# Patient Record
Sex: MALE | Race: WHITE | NOT HISPANIC OR LATINO | Employment: OTHER | URBAN - NONMETROPOLITAN AREA
[De-identification: names, ages, dates, MRNs, and addresses within clinical notes are randomized per-mention and may not be internally consistent; named-entity substitution may affect disease eponyms.]

---

## 2024-04-03 ENCOUNTER — OFFICE VISIT (OUTPATIENT)
Dept: ORTHOPEDIC SURGERY | Facility: CLINIC | Age: 69
End: 2024-04-03
Payer: MEDICARE

## 2024-04-03 VITALS — WEIGHT: 235 LBS | BODY MASS INDEX: 35.61 KG/M2 | HEIGHT: 68 IN

## 2024-04-03 DIAGNOSIS — M17.11 PRIMARY OSTEOARTHRITIS OF RIGHT KNEE: Primary | ICD-10-CM

## 2024-04-03 DIAGNOSIS — M17.12 PRIMARY OSTEOARTHRITIS OF LEFT KNEE: ICD-10-CM

## 2024-04-03 RX ADMIN — METHYLPREDNISOLONE ACETATE 40 MG: 40 INJECTION, SUSPENSION INTRA-ARTICULAR; INTRALESIONAL; INTRAMUSCULAR; SOFT TISSUE at 19:14

## 2024-04-03 RX ADMIN — LIDOCAINE HYDROCHLORIDE 5 ML: 10 INJECTION, SOLUTION EPIDURAL; INFILTRATION; INTRACAUDAL; PERINEURAL at 19:14

## 2024-04-03 NOTE — PROGRESS NOTES
Follow-up Visit         Patient: Abraham Blanco  YOB: 1955  Date of Encounter: 2024      Chief  Complaint:   Chief Complaint   Patient presents with    Left Knee - Follow-up, Pain    Right Knee - Follow-up, Pain         HPI:  Abraham Blanco, 68 y.o. male presents in follow-up bilateral knee pain known osteoarthritis was last treated with intra-articular steroid injections May 3, 2023 he presents today reporting good response initially but pain has slowly returned now to the point his pain has increased significantly.  Reports no additional injuries.        Medical History:  Patient Active Problem List   Diagnosis    Prostate cancer    Acute pancreatitis without infection or necrosis    Rotator cuff tear, non-traumatic, left     Past Medical History:   Diagnosis Date    Arthritis of back     Arthritis of neck     CAD (coronary artery disease)     s/p 4-vessel CABG and stenting    CHF (congestive heart failure)     Diabetes mellitus     Diverticula of colon     Elevated PSA     Frozen shoulder     GERD (gastroesophageal reflux disease)     Heart disease     Hip arthrosis     Hyperlipidemia     Hypertension     Knee swelling     Neck strain     CHANEL (obstructive sleep apnea)     on AutoPAP           Social History:  Social History     Socioeconomic History    Marital status:    Tobacco Use    Smoking status: Former     Current packs/day: 0.00     Average packs/day: 2.0 packs/day for 25.2 years (50.4 ttl pk-yrs)     Types: Cigarettes     Start date: 10/11/1971     Quit date: 1997     Years since quittin.2    Smokeless tobacco: Never   Vaping Use    Vaping status: Never Used   Substance and Sexual Activity    Alcohol use: No    Drug use: No    Sexual activity: Not Currently     Partners: Female           Current Medications:    Current Outpatient Medications:     aspirin 81 MG EC tablet, Take 1 tablet by mouth Daily., Disp: , Rfl:     atorvastatin (LIPITOR) 20 MG tablet, Take 1  tablet by mouth Daily., Disp: , Rfl:     baclofen (LIORESAL) 10 MG tablet, Take 1 tablet by mouth 3 (Three) Times a Day As Needed for Muscle Spasms., Disp: , Rfl:     docusate sodium (Colace) 100 MG capsule, Take 1 capsule by mouth 2 (Two) Times a Day., Disp: 20 capsule, Rfl: 0    finasteride (PROSCAR) 5 MG tablet, Take 1 tablet by mouth Daily., Disp: , Rfl:     gabapentin (NEURONTIN) 300 MG capsule, Take 1 capsule by mouth 3 (Three) Times a Day As Needed., Disp: , Rfl:     insulin glargine (LANTUS, SEMGLEE) 100 UNIT/ML injection, Inject 80 Units under the skin into the appropriate area as directed Every Night., Disp: , Rfl:     isosorbide mononitrate (IMDUR) 60 MG 24 hr tablet, Take 1 tablet by mouth Daily., Disp: , Rfl:     lisinopril (PRINIVIL,ZESTRIL) 10 MG tablet, Take 1 tablet by mouth Daily., Disp: , Rfl:     metoprolol succinate XL (TOPROL-XL) 50 MG 24 hr tablet, Take 1 tablet by mouth Daily., Disp: , Rfl:     NovoLIN R FlexPen 100 UNIT/ML injection, , Disp: , Rfl:     oxyCODONE-acetaminophen (PERCOCET) 5-325 MG per tablet, Take 1 tablet by mouth Every 4 (Four) Hours As Needed for Moderate Pain., Disp: 30 tablet, Rfl: 0    pantoprazole (PROTONIX) 40 MG EC tablet, Take 1 tablet by mouth Daily., Disp: , Rfl:     sertraline (ZOLOFT) 100 MG tablet, Take 1 tablet by mouth Daily., Disp: , Rfl:     tamsulosin (FLOMAX) 0.4 MG capsule 24 hr capsule, Take 1 capsule by mouth Daily., Disp: , Rfl:         Allergies:  Allergies   Allergen Reactions    Phenergan [Promethazine] Hallucinations           Family History:  Family History   Problem Relation Age of Onset    Diabetes Mother     Hypertension Mother     Cancer Mother     Hypertension Father            Surgical History:  Past Surgical History:   Procedure Laterality Date    CARDIAC SURGERY  2011    4 vessel bypass    NECK SURGERY      SHOULDER ARTHROSCOPY W/ ROTATOR CUFF REPAIR Left 11/07/2023    Procedure: LEFT SHOULDER ARTHROSCOPY WITH MINI OPEN ROTATOR CUFF  REPAIR, ACROMIOPLASTY, EXCISION LATERAL CLAVICLE;  Surgeon: Steve Serna MD;  Location: [facility];  Service: Orthopedics;  Laterality: Left;    SHOULDER SURGERY      TONSILLECTOMY             Radiology:   X-ray chest PA and lateral    Result Date: 3/28/2024  1. Mild chronic changes in both lungs, without acute infiltrate. Images reviewed, interpreted, and dictated by Jefferson Fuentes MD           Orthopedic Examination: Bilateral knees reveals minimal effusion with moderate medial joint line tenderness no gross instability range of motion is full neurovascular exam grossly intact.          Assessment & Plan:   68 y.o. male presents osteoarthritis bilateral knees today's provided intra-articular steroid injection Depo-Medrol 40 mg with lidocaine block bilateral knees he will return as needed.           Diagnosis Plan   1. Primary osteoarthritis of right knee        2. Primary osteoarthritis of left knee              Large Joint Arthrocentesis: L knee  Date/Time: 4/3/2024 7:14 PM  Consent given by: patient  Site marked: site marked  Timeout: Immediately prior to procedure a time out was called to verify the correct patient, procedure, equipment, support staff and site/side marked as required   Supporting Documentation  Indications: pain   Procedure Details  Location: knee - L knee  Preparation: Patient was prepped and draped in the usual sterile fashion  Needle size: 25 G  Approach: anterolateral  Medications administered: 5 mL lidocaine PF 1% 1 %; 40 mg methylPREDNISolone acetate 40 MG/ML  Patient tolerance: patient tolerated the procedure well with no immediate complications      Large Joint Arthrocentesis: R knee  Date/Time: 4/3/2024 7:14 PM  Consent given by: patient  Site marked: site marked  Timeout: Immediately prior to procedure a time out was called to verify the correct patient, procedure, equipment, support staff and site/side marked as required   Supporting Documentation  Indications: pain    Procedure Details  Location: knee - R knee  Preparation: Patient was prepped and draped in the usual sterile fashion  Needle size: 25 G  Approach: anterolateral  Medications administered: 40 mg methylPREDNISolone acetate 40 MG/ML; 5 mL lidocaine PF 1% 1 %  Patient tolerance: patient tolerated the procedure well with no immediate complications          Cc:  Estrada Teixeira PA              This document has been electronically signed by Steve Serna MD   April 4, 2024 19:13 EDT

## 2024-04-05 RX ORDER — METHYLPREDNISOLONE ACETATE 40 MG/ML
40 INJECTION, SUSPENSION INTRA-ARTICULAR; INTRALESIONAL; INTRAMUSCULAR; SOFT TISSUE
Status: COMPLETED | OUTPATIENT
Start: 2024-04-03 | End: 2024-04-03

## 2024-04-05 RX ORDER — LIDOCAINE HYDROCHLORIDE 10 MG/ML
5 INJECTION, SOLUTION EPIDURAL; INFILTRATION; INTRACAUDAL; PERINEURAL
Status: COMPLETED | OUTPATIENT
Start: 2024-04-03 | End: 2024-04-03

## 2024-04-17 ENCOUNTER — OFFICE VISIT (OUTPATIENT)
Dept: ORTHOPEDIC SURGERY | Facility: CLINIC | Age: 69
End: 2024-04-17
Payer: MEDICARE

## 2024-04-17 VITALS — WEIGHT: 235 LBS | BODY MASS INDEX: 35.61 KG/M2 | HEIGHT: 68 IN

## 2024-04-17 DIAGNOSIS — M75.122 NONTRAUMATIC COMPLETE TEAR OF LEFT ROTATOR CUFF: Primary | ICD-10-CM

## 2024-04-17 PROCEDURE — 99213 OFFICE O/P EST LOW 20 MIN: CPT | Performed by: ORTHOPAEDIC SURGERY

## 2024-04-17 RX ORDER — EMPAGLIFLOZIN 25 MG/1
TABLET, FILM COATED ORAL
COMMUNITY
Start: 2024-04-11

## 2024-04-17 NOTE — PROGRESS NOTES
Follow-up Visit         Patient: Abraham Blanco  YOB: 1955  Date of Encounter: 04/17/2024      Chief  Complaint:   Chief Complaint   Patient presents with    Left Shoulder - Follow-up, Pain         HPI:  Abraham Blanco, 68 y.o. male presents in follow-up left shoulder pain.  He underwent left shoulder arthroscopy with open rotator cuff repair acromioplasty excision of the lateral clavicle on November 7, 2023.  He was seen 2 months later, January 8, 2024 and reports that he was doing well, pain was well-controlled and describes 90% reduction in his discomfort after completing physical therapy.      Sometime after this his shoulder pain returned, he does not recall an injury.  He attempted to return for follow-up but [facility] no longer participated with his insurance so he traveled to [place] and was evaluated by orthopedic surgeon who suggested reverse shoulder arthroplasty.  He did not proceed and has presented now that we have resumed participation with his insurance provider.  Reports that overall his left shoulder pain is worse than it was prior to his procedure.  He did obtain MRI left shoulder February 14, 2024 by report describing tear of the supraspinatus tendon with retraction.        Medical History:  Patient Active Problem List   Diagnosis    Prostate cancer    Acute pancreatitis without infection or necrosis    Rotator cuff tear, non-traumatic, left     Past Medical History:   Diagnosis Date    Arthritis of back     Arthritis of neck     CAD (coronary artery disease)     s/p 4-vessel CABG and stenting    CHF (congestive heart failure)     Diabetes mellitus     Diverticula of colon     Elevated PSA     Frozen shoulder     GERD (gastroesophageal reflux disease)     Heart disease     Hip arthrosis     Hyperlipidemia     Hypertension     Knee swelling     Neck strain     CHANEL (obstructive sleep apnea)     on AutoPAP           Social History:  Social History     Socioeconomic History     Marital status:    Tobacco Use    Smoking status: Former     Current packs/day: 0.00     Average packs/day: 2.0 packs/day for 25.2 years (50.4 ttl pk-yrs)     Types: Cigarettes     Start date: 10/11/1971     Quit date: 1997     Years since quittin.3    Smokeless tobacco: Never   Vaping Use    Vaping status: Never Used   Substance and Sexual Activity    Alcohol use: No    Drug use: No    Sexual activity: Not Currently     Partners: Female           Current Medications:    Current Outpatient Medications:     aspirin 81 MG EC tablet, Take 1 tablet by mouth Daily., Disp: , Rfl:     atorvastatin (LIPITOR) 20 MG tablet, Take 1 tablet by mouth Daily., Disp: , Rfl:     baclofen (LIORESAL) 10 MG tablet, Take 1 tablet by mouth 3 (Three) Times a Day As Needed for Muscle Spasms., Disp: , Rfl:     docusate sodium (Colace) 100 MG capsule, Take 1 capsule by mouth 2 (Two) Times a Day., Disp: 20 capsule, Rfl: 0    finasteride (PROSCAR) 5 MG tablet, Take 1 tablet by mouth Daily., Disp: , Rfl:     gabapentin (NEURONTIN) 300 MG capsule, Take 1 capsule by mouth 3 (Three) Times a Day As Needed., Disp: , Rfl:     insulin glargine (LANTUS, SEMGLEE) 100 UNIT/ML injection, Inject 80 Units under the skin into the appropriate area as directed Every Night., Disp: , Rfl:     isosorbide mononitrate (IMDUR) 60 MG 24 hr tablet, Take 1 tablet by mouth Daily., Disp: , Rfl:     Jardiance 25 MG tablet tablet, , Disp: , Rfl:     lisinopril (PRINIVIL,ZESTRIL) 10 MG tablet, Take 1 tablet by mouth Daily., Disp: , Rfl:     metoprolol succinate XL (TOPROL-XL) 50 MG 24 hr tablet, Take 1 tablet by mouth Daily., Disp: , Rfl:     NovoLIN R FlexPen 100 UNIT/ML injection, , Disp: , Rfl:     oxyCODONE-acetaminophen (PERCOCET) 5-325 MG per tablet, Take 1 tablet by mouth Every 4 (Four) Hours As Needed for Moderate Pain., Disp: 30 tablet, Rfl: 0    pantoprazole (PROTONIX) 40 MG EC tablet, Take 1 tablet by mouth Daily., Disp: , Rfl:     sertraline  (ZOLOFT) 100 MG tablet, Take 1 tablet by mouth Daily., Disp: , Rfl:     tamsulosin (FLOMAX) 0.4 MG capsule 24 hr capsule, Take 1 capsule by mouth Daily., Disp: , Rfl:         Allergies:  Allergies   Allergen Reactions    Phenergan [Promethazine] Hallucinations           Family History:  Family History   Problem Relation Age of Onset    Diabetes Mother     Hypertension Mother     Cancer Mother     Hypertension Father            Surgical History:  Past Surgical History:   Procedure Laterality Date    CARDIAC SURGERY  2011    4 vessel bypass    NECK SURGERY      SHOULDER ARTHROSCOPY W/ ROTATOR CUFF REPAIR Left 11/07/2023    Procedure: LEFT SHOULDER ARTHROSCOPY WITH MINI OPEN ROTATOR CUFF REPAIR, ACROMIOPLASTY, EXCISION LATERAL CLAVICLE;  Surgeon: Steve Serna MD;  Location: [facility];  Service: Orthopedics;  Laterality: Left;    SHOULDER SURGERY      TONSILLECTOMY             Radiology:   X-ray chest PA and lateral    Result Date: 3/28/2024  1. Mild chronic changes in both lungs, without acute infiltrate. Images reviewed, interpreted, and dictated by Jefferson Fuentes MD       MRI left shoulder by report describes full-thickness tear supraspinatus tendon with approximately 2 cm of retraction my review confirms and reviewing previous MRI he has findings are similar to his original preoperative MRI.      Orthopedic Examination: Left shoulder demonstrates limited forward elevation to approximately 120 degrees passively can be brought to 160 degrees.  He does have strength of left shoulder and positive Jobes maneuver.  Neurovascular exam is grossly intact.          Assessment & Plan:   68 y.o. male presents continued left shoulder pain initially improving following rotator cuff repair with MRI demonstrating retear of his rotator cuff tendon.  Before recommending that he proceed with reverse shoulder arthroplasty, he is referred to Dr. Vega [facility] dedicated shoulder specialist for second opinion.   He is invited to return in the future as needed.           Diagnosis Plan   1. Nontraumatic complete tear of left rotator cuff              Cc:  Estrada Teixeira PA              This document has been electronically signed by Steve Serna MD   April 20, 2024 11:40 EDT

## 2024-04-25 ENCOUNTER — OFFICE VISIT (OUTPATIENT)
Dept: ORTHOPEDIC SURGERY | Facility: CLINIC | Age: 69
End: 2024-04-25
Payer: MEDICARE

## 2024-04-25 ENCOUNTER — PREP FOR SURGERY (OUTPATIENT)
Dept: OTHER | Facility: HOSPITAL | Age: 69
End: 2024-04-25
Payer: MEDICARE

## 2024-04-25 VITALS
HEIGHT: 68 IN | DIASTOLIC BLOOD PRESSURE: 86 MMHG | SYSTOLIC BLOOD PRESSURE: 132 MMHG | BODY MASS INDEX: 35.75 KG/M2 | WEIGHT: 235.89 LBS

## 2024-04-25 DIAGNOSIS — Z98.890 STATUS POST LEFT ROTATOR CUFF REPAIR: ICD-10-CM

## 2024-04-25 DIAGNOSIS — M75.122 NONTRAUMATIC COMPLETE TEAR OF LEFT ROTATOR CUFF: Primary | ICD-10-CM

## 2024-04-25 DIAGNOSIS — R29.818 PSEUDOPARALYSIS: ICD-10-CM

## 2024-04-25 DIAGNOSIS — Z98.890 S/P LEFT ROTATOR CUFF REPAIR: ICD-10-CM

## 2024-04-25 PROBLEM — M19.012 ARTHRITIS OF SHOULDER REGION, LEFT: Status: ACTIVE | Noted: 2024-04-25

## 2024-04-25 RX ORDER — PREGABALIN 75 MG/1
75 CAPSULE ORAL ONCE
OUTPATIENT
Start: 2024-04-25 | End: 2024-04-25

## 2024-04-25 RX ORDER — TRANEXAMIC ACID 10 MG/ML
1000 INJECTION, SOLUTION INTRAVENOUS ONCE
OUTPATIENT
Start: 2024-04-25 | End: 2024-04-25

## 2024-04-25 RX ORDER — ACETAMINOPHEN 500 MG
1000 TABLET ORAL ONCE
OUTPATIENT
Start: 2024-04-25 | End: 2024-04-25

## 2024-04-25 RX ORDER — SCOLOPAMINE TRANSDERMAL SYSTEM 1 MG/1
1 PATCH, EXTENDED RELEASE TRANSDERMAL CONTINUOUS
OUTPATIENT
Start: 2024-04-25 | End: 2024-04-28

## 2024-04-25 RX ORDER — VANCOMYCIN/0.9 % SOD CHLORIDE 1.5G/250ML
15 PLASTIC BAG, INJECTION (ML) INTRAVENOUS ONCE
OUTPATIENT
Start: 2024-04-25 | End: 2024-04-25

## 2024-04-25 RX ORDER — MELOXICAM 15 MG/1
15 TABLET ORAL ONCE
OUTPATIENT
Start: 2024-04-25 | End: 2024-04-25

## 2024-04-25 NOTE — PROGRESS NOTES
"                                                                    [facility] Orthopaedic Surgery Office Visit - Marcel Vega MD    Office Visit       Patient Name: Abraham Blanco    Chief Complaint:   Chief Complaint   Patient presents with    Left Shoulder - Pain     S/P LEFT SHOULDER ARTHROSCOPY WITH MINI OPEN ROTATOR CUFF REPAIR, ACROMIOPLASTY, EXCISION LATERAL CLAVICLE 11/07/2023       Referring Physician: Steve Serna,*  - I appreciate the referral      History of Present Illness:   Abraham Blanco is a 68 y.o. male who presents with left body part: shoulder Reason: pain.  Onset:Onset: atraumatic and gradual in nature. The issue has been ongoing for 9 month(s). Pain is a 7/10 on the pain scale. Pain is described as Pain Characterization: aching, burning, and stabbing. Associated symptoms include Symptoms: pain and stiffness. The pain is worse with sleeping, working, lying on affected side, and any movement of the joint; resting, ice, heat, and pain medication and/or NSAID improve the pain. Previous treatments have included: bracing, NSAIDS, and physical therapy. I have reviewed the patient's history of present illness as noted/entered above.    I have reviewed the patient's past medical history, surgical history, social history, family history, medications, and allergies as noted in the electronic medical record and as noted/entered.  I have reviewed the patient's review of systems as noted/enter and updated as noted in the patient's HPI.      LEFT SHOULDER  Audi  Left shoulder scope and mini-open 11/7/2023 RCR and DCR   x12 years, strong nancy  \"I can't do a lot\" due to pain and weakness of the arm  He is having difficulty with ADLs and doing some work around the house including some work on his patio yesterday.    I counseled on continued nonoperative measures versus operative intervention and he was in favor of operative intervention.  He understands that a revision arthroscopic " procedure would offer little at this time and he was interested in discussing reverse shoulder arthroplasty.    He had four-vessel CABG surgery and he notes that he has done very well after that.  He has had excellent cardiac care he notes.      68 y.o. male  Body mass index is 35.88 kg/m².    Subjective   Subjective      Review of Systems   Constitutional: Negative.  Negative for chills, fatigue and fever.   HENT: Negative.  Negative for congestion and dental problem.    Eyes: Negative.  Negative for blurred vision.   Respiratory: Negative.  Negative for shortness of breath.    Cardiovascular: Negative.  Negative for leg swelling.   Gastrointestinal: Negative.  Negative for abdominal pain.   Endocrine: Negative.  Negative for polyuria.   Genitourinary: Negative.  Negative for difficulty urinating.   Musculoskeletal:  Positive for arthralgias.   Skin: Negative.    Allergic/Immunologic: Negative.    Neurological: Negative.    Hematological: Negative.  Negative for adenopathy.   Psychiatric/Behavioral: Negative.  Negative for behavioral problems.         Past Medical History:   Past Medical History:   Diagnosis Date    Arthritis of back     Arthritis of neck     CAD (coronary artery disease)     s/p 4-vessel CABG and stenting    CHF (congestive heart failure)     Diabetes mellitus     Diverticula of colon     Elevated PSA     Frozen shoulder     GERD (gastroesophageal reflux disease)     Heart disease     Hip arthrosis     Hyperlipidemia     Hypertension     Knee swelling     Neck strain     CHANEL (obstructive sleep apnea)     on AutoPAP       Past Surgical History:   Past Surgical History:   Procedure Laterality Date    CARDIAC SURGERY  2011    4 vessel bypass    NECK SURGERY      SHOULDER ARTHROSCOPY W/ ROTATOR CUFF REPAIR Left 11/07/2023    Procedure: LEFT SHOULDER ARTHROSCOPY WITH MINI OPEN ROTATOR CUFF REPAIR, ACROMIOPLASTY, EXCISION LATERAL CLAVICLE;  Surgeon: Steve Serna MD;  Location: [facility] OR;   Service: Orthopedics;  Laterality: Left;    SHOULDER SURGERY      TONSILLECTOMY         Family History:   Family History   Problem Relation Age of Onset    Diabetes Mother     Hypertension Mother     Cancer Mother     Hypertension Father        Social History:   Social History     Socioeconomic History    Marital status:    Tobacco Use    Smoking status: Former     Current packs/day: 0.00     Average packs/day: 2.0 packs/day for 25.2 years (50.4 ttl pk-yrs)     Types: Cigarettes     Start date: 10/11/1971     Quit date: 1997     Years since quittin.3    Smokeless tobacco: Never   Vaping Use    Vaping status: Never Used   Substance and Sexual Activity    Alcohol use: No    Drug use: No    Sexual activity: Not Currently     Partners: Female       Medications:   Current Outpatient Medications:     aspirin 81 MG EC tablet, Take 1 tablet by mouth Daily., Disp: , Rfl:     atorvastatin (LIPITOR) 20 MG tablet, Take 1 tablet by mouth Daily., Disp: , Rfl:     baclofen (LIORESAL) 10 MG tablet, Take 1 tablet by mouth 3 (Three) Times a Day As Needed for Muscle Spasms., Disp: , Rfl:     docusate sodium (Colace) 100 MG capsule, Take 1 capsule by mouth 2 (Two) Times a Day., Disp: 20 capsule, Rfl: 0    finasteride (PROSCAR) 5 MG tablet, Take 1 tablet by mouth Daily., Disp: , Rfl:     gabapentin (NEURONTIN) 300 MG capsule, Take 1 capsule by mouth 3 (Three) Times a Day As Needed., Disp: , Rfl:     insulin glargine (LANTUS, SEMGLEE) 100 UNIT/ML injection, Inject 80 Units under the skin into the appropriate area as directed Every Night., Disp: , Rfl:     isosorbide mononitrate (IMDUR) 60 MG 24 hr tablet, Take 1 tablet by mouth Daily., Disp: , Rfl:     Jardiance 25 MG tablet tablet, , Disp: , Rfl:     lisinopril (PRINIVIL,ZESTRIL) 10 MG tablet, Take 1 tablet by mouth Daily., Disp: , Rfl:     metoprolol succinate XL (TOPROL-XL) 50 MG 24 hr tablet, Take 1 tablet by mouth Daily., Disp: , Rfl:     NovoLIN R FlexPen 100  "UNIT/ML injection, , Disp: , Rfl:     oxyCODONE-acetaminophen (PERCOCET) 5-325 MG per tablet, Take 1 tablet by mouth Every 4 (Four) Hours As Needed for Moderate Pain., Disp: 30 tablet, Rfl: 0    pantoprazole (PROTONIX) 40 MG EC tablet, Take 1 tablet by mouth Daily., Disp: , Rfl:     sertraline (ZOLOFT) 100 MG tablet, Take 1 tablet by mouth Daily., Disp: , Rfl:     tamsulosin (FLOMAX) 0.4 MG capsule 24 hr capsule, Take 1 capsule by mouth Daily., Disp: , Rfl:     Allergies:   Allergies   Allergen Reactions    Phenergan [Promethazine] Hallucinations       The following portions of the patient's history were reviewed and updated as appropriate: allergies, current medications, past family history, past medical history, past social history, past surgical history and problem list.        Objective    Objective      Vital Signs:   Vitals:    04/25/24 0826   BP: 132/86   Weight: 107 kg (235 lb 14.3 oz)   Height: 172.7 cm (67.99\")       Ortho Exam:  General: no acute distress, comfortable  Vitals reviewed in chart    Musculoskeletal Exam    SIDE: LEFT SHOULDER  Shoulder Exam    Tenderness:  rotator cuff    Prior open incision    Range of motion measurements (degrees)  Forward flexion/Abduction/External rotation at side/ER at 90/IR at 90/IR position  Active: pseudoparalytic very limited active and passive range of motion due to pain particularly active motion.  He does present with pseudoparalysis 60/60/30, pain limited.  Passive: 120/120/45/70/60    Painful arc of motion: yes  Glenohumeral joint contracture: yes  Glenohumeral joint crepitus: negative  Glenohumeral joint pain: yes  No evidence of septic joint  Impingement testing Neer's test - positive/painful  Impingement testing Hawkin's test - positive/painful    Rotator Cuff Testing:  Tenderness to palpation at rotator cuff - yes  Rotator cuff testing Leela's test - positive  Rotator cuff testing External rotation - positive  Rotator cuff testing Lag signs - " positive  Rotator cuff testing Belly press - negative  Pain with abduction great than 90 degrees - yes  Rotator cuff testing limiting by pain    Scapular dyskinesis - present, abnormal scapular motion    Long head of the biceps testing:  Hamm's test for biceps - positive  Bicipital groove tenderness to palpation - positive      Results Review:   Imaging Results (Last 24 Hours)       Procedure Component Value Units Date/Time    XR Shoulder 2+ View Left [[id number]] Resulted: 04/25/24 0929     Updated: 04/25/24 0931    Narrative:      Imaging: shoulder x-rays 3 views - AP, axillary, and scapular-Y x-ray   views    Side: LEFT SHOULDER    Indication for shoulder x-ray 3 views: shoulder pain    Comparison: prior clinic comparison views available    Findings:   LEFT shoulder status post prior rotator cuff repair with 2 metallic   anchors noted.  Evidence of prior distal clavicle resection noted as well.    No acute bony findings noted.    I personally reviewed the above x-rays.          MR upper extremity joint only without IV contrast left side    Result Date: 2/14/2024  1. Postsurgical changes of prior supraspinatus tendon repair with recurrent full-thickness tear and 2.0 cm of tendinous retraction, as described. 2. Abnormal linear signal within anterior labrum, consistent with anterior labral tear. Images reviewed, interpreted, and dictated by Jefferson Fuentes MD      I personally reviewed and personally interpreted the imaging above.  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 with extending into the infraspinatus.,  Type II recurrent tearing    Procedures             Assessment / Plan      Assessment/Plan:   Problem List Items Addressed This Visit          Musculoskeletal and Injuries    Nontraumatic complete tear of left rotator cuff - Primary    Relevant Orders    CT shoulder left wo contrast    XR Shoulder 2+ View Left (Completed)    Status post left rotator cuff repair    Relevant Orders    CT  shoulder left wo contrast       Other    Pseudoparalysis       Discussed surgical and nonsurgical treatment options     Primarily discussed that any additional arthroscopic surgery or rotator cuff repair surgery would be fraught with concerns for irreparable tearing/unable to achieve healing based on the revision setting and lack of healing or recurrent tearing despite solid prior repair.  Patient understands that any arthroscopic surgery or attempted revision even with Biologics would be unpredictable particularly with the presence of pseudoparalysis and type II findings on his tearing.  Patient is already had 1 opinion for reverse shoulder arthroplasty at outside facility he did present today to discuss the possibility of reverse shoulder arthroplasty.  He does not have much by way of arthritis in the joint at this time but he does have pseudoparalysis, irreparable rotator cuff tearing and does desire to regain overhead motion.  He understands limitations of reverse shoulder arthroplasty.  I did counseled that sometimes the [place] protocol or deltoid strengthening could help with active range of motion without the need for reverse shoulder arthroplasty.  He does desire to proceed with reverse shoulder arthroplasty at this time    LEFT SHOULDER  Risks and benefits of continued nonoperative management versus surgical management were discussed. The patient desires to proceed with operative intervention.    Reverse total shoulder replacement was discussed.      Specific risks include pain, bleeding, infection, injury to surrounding nerve and blood vessels, fracture, instability, failure or lack of healing of repair, incomplete pain relief, hardware failure, potential need for additional procedures, stiffness after surgery, and potential inability to restore range of motion and strength. Medical and anesthetic complications were additionally discussed.     General anesthesia is required, sling compliance, and  compliance with physical therapy and/or a surgeon guided exercise program will be very important to the recovery process.    We also discussed the risks and benefits of postoperative medications including potential opioid medications for pain control.       I think that it is important for my patients to know that I work as a paid consultant, teacher, and  for an orthopedic device company/shoulder implant company ("[employer] - now [employer]), so I counseled regarding this.      I counseled the patient that I do serve as a paid consultant, speaker, surgeon educator, and technology and implant design, among other roles.  I do not get paid to use any specific company's implants, and I would never do that.  My number one priority is to provide the best possible care I can for my patients.  I enjoy my opportunity to educate other surgeons, and the ability to advance shoulder surgery with improved technology and improved shoulder surgery techniques and products.    I was happy to disclose my work as a medical device consultant with the patient and offered to answer any related questions.      I counseled the patient using figures, models, and a shoulder textbook.  I was able to show the patient preoperative and postoperative images with background teaching to help that patient make a more informed decision.  I have found images to be helpful to better explain the diagnosis and treatment options.    LEFT SHOULDER  A CT scan (Blueprint Protocol) is critical for assessment of the patient's glenoid morphology and rotator cuff status in anticipation of surgical intervention (shoulder arthroplasty).  The CT scan is critical as a surgical planning tool.  The patient has failed conservative measures and a CT scan is the next critical step in surgical decision making.    The indication for the CT scan without arthrogram is for assessment of the patient's glenoid morphology and rotator cuff status  in anticipation of surgical intervention (shoulder arthroplasty) in the setting of glenohumeral joint arthritis.  The CT scan is a critical surgical planning tool.      LEFT SHOULDER  Diagnosis and CPT Codes  1. Shoulder joint arthritis and rotator cuff tear - Open reverse total shoulder arthroplasty CPT Code 29955  2. Shoulder joint contracture   3. Shoulder biceps tendonitis     Ultrasling III - a neutral rotation sling is recommended for this patient for perioperative care.  The patient was fitted for the sling and the sling was provided today.  The sling will be a critical part of the perioperative care for these diagnoses.    [facility]  Cardiac clearance  A1c target discussed - he is working on this    Follow Up: LEFT SHOULDER        Marcel Vega MD, FAAOS  Orthopedic Surgeon  Fellowship Trained Shoulder and Elbow Surgeon  [facility]  Orthopedics and Sports Medicine  [address] [place] [address], [address]  [place] [address]    04/25/24  17:13 EDT

## 2024-04-26 ENCOUNTER — PATIENT ROUNDING (BHMG ONLY) (OUTPATIENT)
Dept: ORTHOPEDIC SURGERY | Facility: CLINIC | Age: 69
End: 2024-04-26
Payer: MEDICARE

## 2024-04-26 NOTE — PROGRESS NOTES
April 26, 2024    Hello, may I speak with Abraham Blanco?    My name is Karolina      I am  with [facility]  [address] [id number].    Before we get started may I verify your date of birth? 1955    I am calling to officially welcome you to our practice and ask about your recent visit. Is this a good time to talk? No.  Mr. Blanco asked that I call him back another time.  I encouraged him to complete the patient satisfaction survey      Thank you, and have a great day.

## 2024-05-12 ENCOUNTER — APPOINTMENT (OUTPATIENT)
Dept: CT IMAGING | Facility: HOSPITAL | Age: 69
End: 2024-05-12
Payer: OTHER GOVERNMENT

## 2024-05-12 ENCOUNTER — APPOINTMENT (OUTPATIENT)
Dept: GENERAL RADIOLOGY | Facility: HOSPITAL | Age: 69
End: 2024-05-12
Payer: OTHER GOVERNMENT

## 2024-05-12 ENCOUNTER — HOSPITAL ENCOUNTER (INPATIENT)
Facility: HOSPITAL | Age: 69
LOS: 2 days | Discharge: HOME | End: 2024-05-14
Attending: EMERGENCY MEDICINE | Admitting: STUDENT IN AN ORGANIZED HEALTH CARE EDUCATION/TRAINING PROGRAM
Payer: OTHER GOVERNMENT

## 2024-05-12 DIAGNOSIS — J18.9 PNEUMONIA OF LEFT LOWER LOBE DUE TO INFECTIOUS ORGANISM: Primary | ICD-10-CM

## 2024-05-12 DIAGNOSIS — K40.90 INGUINAL HERNIA, RIGHT: ICD-10-CM

## 2024-05-12 DIAGNOSIS — A41.9 SEPSIS, DUE TO UNSPECIFIED ORGANISM, UNSPECIFIED WHETHER ACUTE ORGAN DYSFUNCTION PRESENT: ICD-10-CM

## 2024-05-12 PROBLEM — J96.01 ACUTE RESPIRATORY FAILURE WITH HYPOXEMIA: Status: ACTIVE | Noted: 2024-05-12

## 2024-05-12 LAB
A-A DO2: 51.4 MMHG (ref 0–300)
ALBUMIN SERPL-MCNC: 3.9 G/DL (ref 3.5–5.2)
ALBUMIN/GLOB SERPL: 1.1 G/DL
ALP SERPL-CCNC: 103 U/L (ref 39–117)
ALT SERPL W P-5'-P-CCNC: 14 U/L (ref 1–41)
ANION GAP SERPL CALCULATED.3IONS-SCNC: 12.7 MMOL/L (ref 5–15)
ARTERIAL PATENCY WRIST A: ABNORMAL
AST SERPL-CCNC: 18 U/L (ref 1–40)
ATMOSPHERIC PRESS: 725 MMHG
BACTERIA UR QL AUTO: ABNORMAL /HPF
BASE EXCESS BLDA CALC-SCNC: 0 MMOL/L (ref 0–2)
BASOPHILS # BLD AUTO: 0.09 10*3/MM3 (ref 0–0.2)
BASOPHILS NFR BLD AUTO: 0.4 % (ref 0–1.5)
BDY SITE: ABNORMAL
BILIRUB SERPL-MCNC: 0.7 MG/DL (ref 0–1.2)
BILIRUB UR QL STRIP: NEGATIVE
BUN SERPL-MCNC: 20 MG/DL (ref 8–23)
BUN/CREAT SERPL: 14.3 (ref 7–25)
CALCIUM SPEC-SCNC: 9.5 MG/DL (ref 8.6–10.5)
CHLORIDE SERPL-SCNC: 104 MMOL/L (ref 98–107)
CLARITY UR: CLEAR
CO2 BLDA-SCNC: 25.1 MMOL/L (ref 22–33)
CO2 SERPL-SCNC: 21.3 MMOL/L (ref 22–29)
COHGB MFR BLD: 0.7 % (ref 0–5)
COLOR UR: YELLOW
CREAT SERPL-MCNC: 1.4 MG/DL (ref 0.76–1.27)
CRP SERPL-MCNC: 9.31 MG/DL (ref 0–0.5)
D-LACTATE SERPL-SCNC: 1.4 MMOL/L (ref 0.5–2)
DEPRECATED RDW RBC AUTO: 43.2 FL (ref 37–54)
EGFRCR SERPLBLD CKD-EPI 2021: 54.1 ML/MIN/1.73
EOSINOPHIL # BLD AUTO: 0.09 10*3/MM3 (ref 0–0.4)
EOSINOPHIL NFR BLD AUTO: 0.4 % (ref 0.3–6.2)
ERYTHROCYTE [DISTWIDTH] IN BLOOD BY AUTOMATED COUNT: 16.3 % (ref 12.3–15.4)
GEN 5 2HR TROPONIN T REFLEX: 32 NG/L
GLOBULIN UR ELPH-MCNC: 3.6 GM/DL
GLUCOSE BLDC GLUCOMTR-MCNC: 108 MG/DL (ref 70–130)
GLUCOSE SERPL-MCNC: 128 MG/DL (ref 65–99)
GLUCOSE UR STRIP-MCNC: ABNORMAL MG/DL
HBA1C MFR BLD: 8.5 % (ref 4.8–5.6)
HCO3 BLDA-SCNC: 23.9 MMOL/L (ref 20–26)
HCT VFR BLD AUTO: 49.7 % (ref 37.5–51)
HCT VFR BLD CALC: 45.6 % (ref 38–51)
HGB BLD-MCNC: 15.6 G/DL (ref 13–17.7)
HGB BLDA-MCNC: 14.9 G/DL (ref 14–18)
HGB UR QL STRIP.AUTO: NEGATIVE
HOLD SPECIMEN: NORMAL
HYALINE CASTS UR QL AUTO: ABNORMAL /LPF
IMM GRANULOCYTES # BLD AUTO: 0.14 10*3/MM3 (ref 0–0.05)
IMM GRANULOCYTES NFR BLD AUTO: 0.7 % (ref 0–0.5)
INHALED O2 CONCENTRATION: 21 %
KETONES UR QL STRIP: NEGATIVE
L PNEUMO1 AG UR QL IA: NEGATIVE
LEUKOCYTE ESTERASE UR QL STRIP.AUTO: NEGATIVE
LIPASE SERPL-CCNC: 27 U/L (ref 13–60)
LYMPHOCYTES # BLD AUTO: 1.08 10*3/MM3 (ref 0.7–3.1)
LYMPHOCYTES NFR BLD AUTO: 5.3 % (ref 19.6–45.3)
Lab: ABNORMAL
Lab: ABNORMAL
MCH RBC QN AUTO: 24.3 PG (ref 26.6–33)
MCHC RBC AUTO-ENTMCNC: 31.4 G/DL (ref 31.5–35.7)
MCV RBC AUTO: 77.3 FL (ref 79–97)
METHGB BLD QL: 0.3 % (ref 0–3)
MODALITY: ABNORMAL
MONOCYTES # BLD AUTO: 1.81 10*3/MM3 (ref 0.1–0.9)
MONOCYTES NFR BLD AUTO: 9 % (ref 5–12)
MRSA DNA SPEC QL NAA+PROBE: NORMAL
NEUTROPHILS NFR BLD AUTO: 17.01 10*3/MM3 (ref 1.7–7)
NEUTROPHILS NFR BLD AUTO: 84.2 % (ref 42.7–76)
NITRITE UR QL STRIP: NEGATIVE
NOTE: ABNORMAL
NOTIFIED BY: ABNORMAL
NOTIFIED WHO: ABNORMAL
NRBC BLD AUTO-RTO: 0 /100 WBC (ref 0–0.2)
NT-PROBNP SERPL-MCNC: 451.2 PG/ML (ref 0–900)
OXYHGB MFR BLDV: 85.9 % (ref 94–99)
PCO2 BLDA: 36.1 MM HG (ref 35–45)
PCO2 TEMP ADJ BLD: ABNORMAL MM[HG]
PH BLDA: 7.43 PH UNITS (ref 7.35–7.45)
PH UR STRIP.AUTO: 5.5 [PH] (ref 5–8)
PH, TEMP CORRECTED: ABNORMAL
PLATELET # BLD AUTO: 202 10*3/MM3 (ref 140–450)
PMV BLD AUTO: 9.4 FL (ref 6–12)
PO2 BLDA: 50.2 MM HG (ref 83–108)
PO2 TEMP ADJ BLD: ABNORMAL MM[HG]
POTASSIUM SERPL-SCNC: 4.3 MMOL/L (ref 3.5–5.2)
PROCALCITONIN SERPL-MCNC: 1.34 NG/ML (ref 0–0.25)
PROT SERPL-MCNC: 7.5 G/DL (ref 6–8.5)
PROT UR QL STRIP: ABNORMAL
RBC # BLD AUTO: 6.43 10*6/MM3 (ref 4.14–5.8)
RBC # UR STRIP: ABNORMAL /HPF
REF LAB TEST METHOD: ABNORMAL
SAO2 % BLDCOA: 86.8 % (ref 94–99)
SODIUM SERPL-SCNC: 138 MMOL/L (ref 136–145)
SP GR UR STRIP: >1.03 (ref 1–1.03)
SQUAMOUS #/AREA URNS HPF: ABNORMAL /HPF
TROPONIN T DELTA: -8 NG/L
TROPONIN T SERPL HS-MCNC: 40 NG/L
UROBILINOGEN UR QL STRIP: ABNORMAL
VENTILATOR MODE: ABNORMAL
WBC # UR STRIP: ABNORMAL /HPF
WBC NRBC COR # BLD AUTO: 20.22 10*3/MM3 (ref 3.4–10.8)
WHOLE BLOOD HOLD COAG: NORMAL
WHOLE BLOOD HOLD SPECIMEN: NORMAL

## 2024-05-12 PROCEDURE — 82805 BLOOD GASES W/O2 SATURATION: CPT

## 2024-05-12 PROCEDURE — 25010000002 PIPERACILLIN SOD-TAZOBACTAM PER 1 G: Performed by: PHYSICIAN ASSISTANT

## 2024-05-12 PROCEDURE — 83050 HGB METHEMOGLOBIN QUAN: CPT

## 2024-05-12 PROCEDURE — 86140 C-REACTIVE PROTEIN: CPT | Performed by: PHYSICIAN ASSISTANT

## 2024-05-12 PROCEDURE — 36415 COLL VENOUS BLD VENIPUNCTURE: CPT | Performed by: HOSPITALIST

## 2024-05-12 PROCEDURE — 36600 WITHDRAWAL OF ARTERIAL BLOOD: CPT

## 2024-05-12 PROCEDURE — 83036 HEMOGLOBIN GLYCOSYLATED A1C: CPT | Performed by: HOSPITALIST

## 2024-05-12 PROCEDURE — 82375 ASSAY CARBOXYHB QUANT: CPT

## 2024-05-12 PROCEDURE — 82948 REAGENT STRIP/BLOOD GLUCOSE: CPT

## 2024-05-12 PROCEDURE — 74177 CT ABD & PELVIS W/CONTRAST: CPT | Performed by: RADIOLOGY

## 2024-05-12 PROCEDURE — 25810000003 SODIUM CHLORIDE 0.9 % SOLUTION: Performed by: PHYSICIAN ASSISTANT

## 2024-05-12 PROCEDURE — 71045 X-RAY EXAM CHEST 1 VIEW: CPT

## 2024-05-12 PROCEDURE — 63710000001 INSULIN GLARGINE PER 5 UNITS: Performed by: HOSPITALIST

## 2024-05-12 PROCEDURE — 93010 ELECTROCARDIOGRAM REPORT: CPT | Performed by: INTERNAL MEDICINE

## 2024-05-12 PROCEDURE — 74177 CT ABD & PELVIS W/CONTRAST: CPT

## 2024-05-12 PROCEDURE — 93005 ELECTROCARDIOGRAM TRACING: CPT | Performed by: HOSPITALIST

## 2024-05-12 PROCEDURE — 85025 COMPLETE CBC W/AUTO DIFF WBC: CPT | Performed by: EMERGENCY MEDICINE

## 2024-05-12 PROCEDURE — 99285 EMERGENCY DEPT VISIT HI MDM: CPT

## 2024-05-12 PROCEDURE — 83690 ASSAY OF LIPASE: CPT | Performed by: EMERGENCY MEDICINE

## 2024-05-12 PROCEDURE — 83605 ASSAY OF LACTIC ACID: CPT | Performed by: EMERGENCY MEDICINE

## 2024-05-12 PROCEDURE — 81001 URINALYSIS AUTO W/SCOPE: CPT | Performed by: EMERGENCY MEDICINE

## 2024-05-12 PROCEDURE — 80053 COMPREHEN METABOLIC PANEL: CPT | Performed by: EMERGENCY MEDICINE

## 2024-05-12 PROCEDURE — 25510000001 IOPAMIDOL 61 % SOLUTION: Performed by: EMERGENCY MEDICINE

## 2024-05-12 PROCEDURE — 99223 1ST HOSP IP/OBS HIGH 75: CPT | Performed by: HOSPITALIST

## 2024-05-12 PROCEDURE — 87899 AGENT NOS ASSAY W/OPTIC: CPT | Performed by: STUDENT IN AN ORGANIZED HEALTH CARE EDUCATION/TRAINING PROGRAM

## 2024-05-12 PROCEDURE — 25810000003 SODIUM CHLORIDE 0.9 % SOLUTION 1,000 ML FLEX CONT: Performed by: PHYSICIAN ASSISTANT

## 2024-05-12 PROCEDURE — 87449 NOS EACH ORGANISM AG IA: CPT | Performed by: STUDENT IN AN ORGANIZED HEALTH CARE EDUCATION/TRAINING PROGRAM

## 2024-05-12 PROCEDURE — 25010000002 METRONIDAZOLE 500 MG/100ML SOLUTION: Performed by: HOSPITALIST

## 2024-05-12 PROCEDURE — 84484 ASSAY OF TROPONIN QUANT: CPT | Performed by: HOSPITALIST

## 2024-05-12 PROCEDURE — 25010000002 ENOXAPARIN PER 10 MG: Performed by: STUDENT IN AN ORGANIZED HEALTH CARE EDUCATION/TRAINING PROGRAM

## 2024-05-12 PROCEDURE — 87040 BLOOD CULTURE FOR BACTERIA: CPT | Performed by: PHYSICIAN ASSISTANT

## 2024-05-12 PROCEDURE — 71045 X-RAY EXAM CHEST 1 VIEW: CPT | Performed by: RADIOLOGY

## 2024-05-12 PROCEDURE — 25810000003 SODIUM CHLORIDE 0.9 % SOLUTION: Performed by: HOSPITALIST

## 2024-05-12 PROCEDURE — 87641 MR-STAPH DNA AMP PROBE: CPT | Performed by: STUDENT IN AN ORGANIZED HEALTH CARE EDUCATION/TRAINING PROGRAM

## 2024-05-12 PROCEDURE — 94799 UNLISTED PULMONARY SVC/PX: CPT

## 2024-05-12 PROCEDURE — 25010000002 CEFTRIAXONE PER 250 MG: Performed by: HOSPITALIST

## 2024-05-12 PROCEDURE — 84145 PROCALCITONIN (PCT): CPT | Performed by: PHYSICIAN ASSISTANT

## 2024-05-12 PROCEDURE — 83880 ASSAY OF NATRIURETIC PEPTIDE: CPT | Performed by: HOSPITALIST

## 2024-05-12 RX ORDER — INSULIN LISPRO 100 [IU]/ML
2-9 INJECTION, SOLUTION INTRAVENOUS; SUBCUTANEOUS
Status: DISCONTINUED | OUTPATIENT
Start: 2024-05-12 | End: 2024-05-14 | Stop reason: HOSPADM

## 2024-05-12 RX ORDER — AMOXICILLIN 250 MG
2 CAPSULE ORAL 2 TIMES DAILY PRN
Status: DISCONTINUED | OUTPATIENT
Start: 2024-05-12 | End: 2024-05-14 | Stop reason: HOSPADM

## 2024-05-12 RX ORDER — METOPROLOL TARTRATE 50 MG/1
50 TABLET, FILM COATED ORAL NIGHTLY
Status: CANCELLED | OUTPATIENT
Start: 2024-05-12

## 2024-05-12 RX ORDER — METRONIDAZOLE 500 MG/100ML
500 INJECTION, SOLUTION INTRAVENOUS EVERY 8 HOURS
Qty: 1500 ML | Refills: 0 | Status: DISCONTINUED | OUTPATIENT
Start: 2024-05-12 | End: 2024-05-13

## 2024-05-12 RX ORDER — SODIUM CHLORIDE 9 MG/ML
40 INJECTION, SOLUTION INTRAVENOUS AS NEEDED
Status: DISCONTINUED | OUTPATIENT
Start: 2024-05-12 | End: 2024-05-14 | Stop reason: HOSPADM

## 2024-05-12 RX ORDER — BISACODYL 10 MG
10 SUPPOSITORY, RECTAL RECTAL DAILY PRN
Status: DISCONTINUED | OUTPATIENT
Start: 2024-05-12 | End: 2024-05-14 | Stop reason: HOSPADM

## 2024-05-12 RX ORDER — POTASSIUM CHLORIDE 20 MEQ/1
10 TABLET, EXTENDED RELEASE ORAL DAILY PRN
Status: CANCELLED | OUTPATIENT
Start: 2024-05-12

## 2024-05-12 RX ORDER — LIDOCAINE 50 MG/G
1 PATCH TOPICAL DAILY PRN
COMMUNITY

## 2024-05-12 RX ORDER — LIDOCAINE 50 MG/G
1 OINTMENT TOPICAL
COMMUNITY

## 2024-05-12 RX ORDER — ISOSORBIDE MONONITRATE 30 MG/1
60 TABLET, EXTENDED RELEASE ORAL DAILY
Status: CANCELLED | OUTPATIENT
Start: 2024-05-13

## 2024-05-12 RX ORDER — ASPIRIN 81 MG/1
81 TABLET ORAL DAILY
Status: CANCELLED | OUTPATIENT
Start: 2024-05-13

## 2024-05-12 RX ORDER — GLUCAGON 1 MG/ML
1 KIT INJECTION
Status: DISCONTINUED | OUTPATIENT
Start: 2024-05-12 | End: 2024-05-14 | Stop reason: HOSPADM

## 2024-05-12 RX ORDER — TAMSULOSIN HYDROCHLORIDE 0.4 MG/1
0.4 CAPSULE ORAL NIGHTLY
Status: CANCELLED | OUTPATIENT
Start: 2024-05-12

## 2024-05-12 RX ORDER — ATORVASTATIN CALCIUM 20 MG/1
20 TABLET, FILM COATED ORAL DAILY
Status: CANCELLED | OUTPATIENT
Start: 2024-05-13

## 2024-05-12 RX ORDER — SODIUM CHLORIDE 9 MG/ML
75 INJECTION, SOLUTION INTRAVENOUS CONTINUOUS
Status: DISCONTINUED | OUTPATIENT
Start: 2024-05-12 | End: 2024-05-14 | Stop reason: HOSPADM

## 2024-05-12 RX ORDER — BACLOFEN 10 MG/1
10 TABLET ORAL 3 TIMES DAILY PRN
Status: CANCELLED | OUTPATIENT
Start: 2024-05-12

## 2024-05-12 RX ORDER — FUROSEMIDE 20 MG/1
10 TABLET ORAL DAILY PRN
COMMUNITY

## 2024-05-12 RX ORDER — CHLORAL HYDRATE 500 MG
1000 CAPSULE ORAL
COMMUNITY

## 2024-05-12 RX ORDER — LOSARTAN POTASSIUM 50 MG/1
100 TABLET ORAL DAILY
Status: CANCELLED | OUTPATIENT
Start: 2024-05-13

## 2024-05-12 RX ORDER — LIDOCAINE 50 MG/G
1 PATCH TOPICAL DAILY PRN
Status: CANCELLED | OUTPATIENT
Start: 2024-05-12

## 2024-05-12 RX ORDER — ACETAMINOPHEN AND CODEINE PHOSPHATE 300; 30 MG/1; MG/1
1 TABLET ORAL 3 TIMES DAILY
Status: CANCELLED | OUTPATIENT
Start: 2024-05-12

## 2024-05-12 RX ORDER — POLYETHYLENE GLYCOL 3350 17 G/17G
17 POWDER, FOR SOLUTION ORAL DAILY PRN
Status: DISCONTINUED | OUTPATIENT
Start: 2024-05-12 | End: 2024-05-14 | Stop reason: HOSPADM

## 2024-05-12 RX ORDER — INSULIN GLARGINE 100 [IU]/ML
80 INJECTION, SOLUTION SUBCUTANEOUS NIGHTLY
Status: CANCELLED | OUTPATIENT
Start: 2024-05-12

## 2024-05-12 RX ORDER — FUROSEMIDE 20 MG/1
10 TABLET ORAL DAILY PRN
Status: CANCELLED | OUTPATIENT
Start: 2024-05-12

## 2024-05-12 RX ORDER — LOSARTAN POTASSIUM 100 MG/1
100 TABLET ORAL DAILY
COMMUNITY
End: 2024-05-14 | Stop reason: HOSPADM

## 2024-05-12 RX ORDER — SODIUM CHLORIDE 0.9 % (FLUSH) 0.9 %
10 SYRINGE (ML) INJECTION EVERY 12 HOURS SCHEDULED
Status: DISCONTINUED | OUTPATIENT
Start: 2024-05-12 | End: 2024-05-14 | Stop reason: HOSPADM

## 2024-05-12 RX ORDER — LIDOCAINE 40 MG/G
CREAM TOPICAL AS NEEDED
Status: CANCELLED | OUTPATIENT
Start: 2024-05-12

## 2024-05-12 RX ORDER — DEXTROSE MONOHYDRATE 25 G/50ML
25 INJECTION, SOLUTION INTRAVENOUS
Status: DISCONTINUED | OUTPATIENT
Start: 2024-05-12 | End: 2024-05-14 | Stop reason: HOSPADM

## 2024-05-12 RX ORDER — NITROGLYCERIN 0.4 MG/1
0.4 TABLET SUBLINGUAL
Status: DISCONTINUED | OUTPATIENT
Start: 2024-05-12 | End: 2024-05-14 | Stop reason: HOSPADM

## 2024-05-12 RX ORDER — GABAPENTIN 300 MG/1
300 CAPSULE ORAL 3 TIMES DAILY PRN
Status: CANCELLED | OUTPATIENT
Start: 2024-05-12

## 2024-05-12 RX ORDER — PANTOPRAZOLE SODIUM 40 MG/1
40 TABLET, DELAYED RELEASE ORAL DAILY
Status: CANCELLED | OUTPATIENT
Start: 2024-05-13

## 2024-05-12 RX ORDER — BISACODYL 5 MG/1
5 TABLET, DELAYED RELEASE ORAL DAILY PRN
Status: DISCONTINUED | OUTPATIENT
Start: 2024-05-12 | End: 2024-05-14 | Stop reason: HOSPADM

## 2024-05-12 RX ORDER — SODIUM CHLORIDE 0.9 % (FLUSH) 0.9 %
10 SYRINGE (ML) INJECTION AS NEEDED
Status: DISCONTINUED | OUTPATIENT
Start: 2024-05-12 | End: 2024-05-14 | Stop reason: HOSPADM

## 2024-05-12 RX ORDER — ENOXAPARIN SODIUM 100 MG/ML
40 INJECTION SUBCUTANEOUS DAILY
Status: DISCONTINUED | OUTPATIENT
Start: 2024-05-12 | End: 2024-05-14 | Stop reason: HOSPADM

## 2024-05-12 RX ORDER — FINASTERIDE 5 MG/1
5 TABLET, FILM COATED ORAL DAILY
Status: CANCELLED | OUTPATIENT
Start: 2024-05-13

## 2024-05-12 RX ORDER — METOPROLOL TARTRATE 50 MG/1
50 TABLET, FILM COATED ORAL NIGHTLY
COMMUNITY

## 2024-05-12 RX ORDER — POTASSIUM CHLORIDE 750 MG/1
10 TABLET, FILM COATED, EXTENDED RELEASE ORAL DAILY PRN
COMMUNITY

## 2024-05-12 RX ORDER — ACETAMINOPHEN AND CODEINE PHOSPHATE 300; 30 MG/1; MG/1
1 TABLET ORAL 3 TIMES DAILY
Status: ON HOLD | COMMUNITY
End: 2024-05-13

## 2024-05-12 RX ORDER — MELATONIN
1000 DAILY
COMMUNITY

## 2024-05-12 RX ORDER — MELATONIN
1000 DAILY
Status: CANCELLED | OUTPATIENT
Start: 2024-05-13

## 2024-05-12 RX ORDER — MAGNESIUM OXIDE 420 MG/1
1 TABLET ORAL DAILY
COMMUNITY

## 2024-05-12 RX ORDER — NICOTINE POLACRILEX 4 MG
15 LOZENGE BUCCAL
Status: DISCONTINUED | OUTPATIENT
Start: 2024-05-12 | End: 2024-05-14 | Stop reason: HOSPADM

## 2024-05-12 RX ADMIN — PIPERACILLIN SODIUM AND TAZOBACTAM SODIUM 3.38 G: 3; .375 INJECTION, POWDER, LYOPHILIZED, FOR SOLUTION INTRAVENOUS at 16:20

## 2024-05-12 RX ADMIN — INSULIN GLARGINE 40 UNITS: 100 INJECTION, SOLUTION SUBCUTANEOUS at 21:27

## 2024-05-12 RX ADMIN — DOXYCYCLINE 100 MG: 100 INJECTION, POWDER, LYOPHILIZED, FOR SOLUTION INTRAVENOUS at 21:23

## 2024-05-12 RX ADMIN — METRONIDAZOLE 500 MG: 500 INJECTION, SOLUTION INTRAVENOUS at 21:23

## 2024-05-12 RX ADMIN — SODIUM CHLORIDE 1000 ML: 9 INJECTION, SOLUTION INTRAVENOUS at 16:20

## 2024-05-12 RX ADMIN — ENOXAPARIN SODIUM 40 MG: 40 INJECTION SUBCUTANEOUS at 22:29

## 2024-05-12 RX ADMIN — IOPAMIDOL 70 ML: 612 INJECTION, SOLUTION INTRAVENOUS at 16:02

## 2024-05-12 RX ADMIN — CEFTRIAXONE 1000 MG: 1 INJECTION, POWDER, FOR SOLUTION INTRAMUSCULAR; INTRAVENOUS at 21:22

## 2024-05-12 RX ADMIN — Medication 10 ML: at 22:30

## 2024-05-12 RX ADMIN — SODIUM CHLORIDE 75 ML/HR: 9 INJECTION, SOLUTION INTRAVENOUS at 22:28

## 2024-05-12 NOTE — ED PROVIDER NOTES
"Subjective   History of Present Illness  68-year-old male with past medical history of obstructive sleep apnea on AutoPap nightly, hypertension, hyperlipidemia, GERD, diverticulosis, diabetes, CHF, coronary artery disease status post four-vessel CABG and stenting, and arthritis presents to the emergency room with right lower quadrant/inguinal pain for the past 2 to 3 days with vomiting and diarrhea which began this day.  Patient denies any shortness of breath, chest pain, fevers, chills, or bodyaches.  He does report a cough 2 days ago after having excess water in his CPAP mask and per his wife she states that he continuously has to \"sling the water\" out of the mask.  Patient does tell me that he has normal bowel movements and passes flatus without difficulty.  He did have a bout of emesis this morning which is what prompted to come to the emergency room secondary to right inguinal pain.  He states he notices a knot in the right inguinal area when he lifts things or pushes himself back in his recliner.  Denies any other complaints or concerns at this time.    History provided by:  Patient and spouse   used: No        Review of Systems   Constitutional: Negative.  Negative for fever.   HENT: Negative.     Respiratory:  Positive for cough (2 days ago after having abundance of water in CPAP mask).    Cardiovascular: Negative.  Negative for chest pain.   Gastrointestinal:  Positive for abdominal pain, diarrhea and vomiting.   Endocrine: Negative.    Genitourinary: Negative.  Negative for dysuria.   Skin: Negative.    Neurological: Negative.    Psychiatric/Behavioral: Negative.     All other systems reviewed and are negative.      Past Medical History:   Diagnosis Date    Arthritis of back     Arthritis of neck     CAD (coronary artery disease)     s/p 4-vessel CABG and stenting    CHF (congestive heart failure)     Diabetes mellitus     Diverticula of colon     Elevated PSA     Frozen shoulder     " GERD (gastroesophageal reflux disease)     Heart disease     Hip arthrosis     Hyperlipidemia     Hypertension     Knee swelling     Neck strain     CHANEL (obstructive sleep apnea)     on AutoPAP       Allergies   Allergen Reactions    Phenergan [Promethazine] Hallucinations       Past Surgical History:   Procedure Laterality Date    CARDIAC SURGERY      4 vessel bypass    NECK SURGERY      SHOULDER ARTHROSCOPY W/ ROTATOR CUFF REPAIR Left 2023    Procedure: LEFT SHOULDER ARTHROSCOPY WITH MINI OPEN ROTATOR CUFF REPAIR, ACROMIOPLASTY, EXCISION LATERAL CLAVICLE;  Surgeon: Steve Serna MD;  Location: [facility];  Service: Orthopedics;  Laterality: Left;    SHOULDER SURGERY      TONSILLECTOMY         Family History   Problem Relation Age of Onset    Diabetes Mother     Hypertension Mother     Cancer Mother     Hypertension Father        Social History     Socioeconomic History    Marital status:    Tobacco Use    Smoking status: Former     Current packs/day: 0.00     Average packs/day: 2.0 packs/day for 25.2 years (50.4 ttl pk-yrs)     Types: Cigarettes     Start date: 10/11/1971     Quit date: 1997     Years since quittin.3    Smokeless tobacco: Never   Vaping Use    Vaping status: Never Used   Substance and Sexual Activity    Alcohol use: No    Drug use: No    Sexual activity: Not Currently     Partners: Female           Objective   Physical Exam  Vitals and nursing note reviewed.   Constitutional:       General: He is in acute distress (looks distressed to me, however wife tells me this is his normal).      Appearance: He is well-developed. He is not diaphoretic.   HENT:      Head: Normocephalic and atraumatic.      Right Ear: External ear normal.      Left Ear: External ear normal.      Nose: Nose normal.   Eyes:      Conjunctiva/sclera: Conjunctivae normal.      Pupils: Pupils are equal, round, and reactive to light.   Neck:      Vascular: No JVD.      Trachea: No tracheal  deviation.   Cardiovascular:      Rate and Rhythm: Normal rate and regular rhythm.      Heart sounds: Normal heart sounds. No murmur heard.  Pulmonary:      Effort: Pulmonary effort is normal. No respiratory distress.      Breath sounds: Normal breath sounds. No wheezing.   Abdominal:      General: Abdomen is protuberant. Bowel sounds are normal.      Palpations: Abdomen is soft.      Tenderness: There is abdominal tenderness.       Musculoskeletal:         General: No deformity. Normal range of motion.      Cervical back: Normal range of motion and neck supple.   Skin:     General: Skin is warm and dry.      Coloration: Skin is not pale.      Findings: No erythema or rash.   Neurological:      Mental Status: He is alert and oriented to person, place, and time.      Cranial Nerves: No cranial nerve deficit.   Psychiatric:         Behavior: Behavior normal.         Thought Content: Thought content normal.         Procedures           ED Course  ED Course as of 05/12/24 1805   Sun May 12, 2024   1508 Lactate: 1.4 [TK]   1720 CT Abdomen Pelvis With Contrast [TK]   1759 Spoke with Dr. Butt, she will admit patient to hospitalist services for further evaluation and treatment. [TK]      ED Course User Index  [TK] Bella Vargas PA-C                                   Results for orders placed or performed during the hospital encounter of 05/12/24   Comprehensive Metabolic Panel    Specimen: Arm, Left; Blood   Result Value Ref Range    Glucose 128 (H) 65 - 99 mg/dL    BUN 20 8 - 23 mg/dL    Creatinine 1.40 (H) 0.76 - 1.27 mg/dL    Sodium 138 136 - 145 mmol/L    Potassium 4.3 3.5 - 5.2 mmol/L    Chloride 104 98 - 107 mmol/L    CO2 21.3 (L) 22.0 - 29.0 mmol/L    Calcium 9.5 8.6 - 10.5 mg/dL    Total Protein 7.5 6.0 - 8.5 g/dL    Albumin 3.9 3.5 - 5.2 g/dL    ALT (SGPT) 14 1 - 41 U/L    AST (SGOT) 18 1 - 40 U/L    Alkaline Phosphatase 103 39 - 117 U/L    Total Bilirubin 0.7 0.0 - 1.2 mg/dL    Globulin 3.6 gm/dL    A/G  Ratio 1.1 g/dL    BUN/Creatinine Ratio 14.3 7.0 - 25.0    Anion Gap 12.7 5.0 - 15.0 mmol/L    eGFR 54.1 (L) >60.0 mL/min/1.73   Lipase    Specimen: Arm, Left; Blood   Result Value Ref Range    Lipase 27 13 - 60 U/L   Lactic Acid, Plasma    Specimen: Arm, Left; Blood   Result Value Ref Range    Lactate 1.4 0.5 - 2.0 mmol/L   CBC Auto Differential    Specimen: Arm, Left; Blood   Result Value Ref Range    WBC 20.22 (H) 3.40 - 10.80 10*3/mm3    RBC 6.43 (H) 4.14 - 5.80 10*6/mm3    Hemoglobin 15.6 13.0 - 17.7 g/dL    Hematocrit 49.7 37.5 - 51.0 %    MCV 77.3 (L) 79.0 - 97.0 fL    MCH 24.3 (L) 26.6 - 33.0 pg    MCHC 31.4 (L) 31.5 - 35.7 g/dL    RDW 16.3 (H) 12.3 - 15.4 %    RDW-SD 43.2 37.0 - 54.0 fl    MPV 9.4 6.0 - 12.0 fL    Platelets 202 140 - 450 10*3/mm3    Neutrophil % 84.2 (H) 42.7 - 76.0 %    Lymphocyte % 5.3 (L) 19.6 - 45.3 %    Monocyte % 9.0 5.0 - 12.0 %    Eosinophil % 0.4 0.3 - 6.2 %    Basophil % 0.4 0.0 - 1.5 %    Immature Grans % 0.7 (H) 0.0 - 0.5 %    Neutrophils, Absolute 17.01 (H) 1.70 - 7.00 10*3/mm3    Lymphocytes, Absolute 1.08 0.70 - 3.10 10*3/mm3    Monocytes, Absolute 1.81 (H) 0.10 - 0.90 10*3/mm3    Eosinophils, Absolute 0.09 0.00 - 0.40 10*3/mm3    Basophils, Absolute 0.09 0.00 - 0.20 10*3/mm3    Immature Grans, Absolute 0.14 (H) 0.00 - 0.05 10*3/mm3    nRBC 0.0 0.0 - 0.2 /100 WBC   Procalcitonin    Specimen: Arm, Left; Blood   Result Value Ref Range    Procalcitonin 1.34 (H) 0.00 - 0.25 ng/mL   C-reactive Protein    Specimen: Arm, Left; Blood   Result Value Ref Range    C-Reactive Protein 9.31 (H) 0.00 - 0.50 mg/dL   Blood Gas, Arterial With Co-Ox    Specimen: Arterial Blood   Result Value Ref Range    Site Right Brachial     David's Test N/A     pH, Arterial 7.430 7.350 - 7.450 pH units    pCO2, Arterial 36.1 35.0 - 45.0 mm Hg    pO2, Arterial 50.2 (C) 83.0 - 108.0 mm Hg    HCO3, Arterial 23.9 20.0 - 26.0 mmol/L    Base Excess, Arterial 0.0 0.0 - 2.0 mmol/L    O2 Saturation, Arterial 86.8 (L)  "94.0 - 99.0 %    Hemoglobin, Blood Gas 14.9 14 - 18 g/dL    Hematocrit, Blood Gas 45.6 38.0 - 51.0 %    Oxyhemoglobin 85.9 (L) 94 - 99 %    Methemoglobin 0.30 0.00 - 3.00 %    Carboxyhemoglobin 0.7 0 - 5 %    A-a DO2 51.4 0.0 - 300.0 mmHg    CO2 Content 25.1 22 - 33 mmol/L    Barometric Pressure for Blood Gas 725 mmHg    Modality Room Air     FIO2 21 %    Ventilator Mode NA     Note Read back and acknowledge     Notified Who GIANA SALEH // RN     Notified By [id number]     Notified Time 05/12/2024 16:14     Collected by [id number]     pH, Temp Corrected      pCO2, Temperature Corrected      pO2, Temperature Corrected     Green Top (Gel)   Result Value Ref Range    Extra Tube Hold for add-ons.    Lavender Top   Result Value Ref Range    Extra Tube hold for add-on    Gold Top - SST   Result Value Ref Range    Extra Tube Hold for add-ons.    Light Blue Top   Result Value Ref Range    Extra Tube Hold for add-ons.        CT Abdomen Pelvis With Contrast   Final Result       Left lower lobe infection versus aspiration.        This report was finalized on 5/12/2024 4:17 PM by Melody Urias MD.          XR Chest 1 View    (Results Pending)                 Medical Decision Making  68-year-old male with past medical history of obstructive sleep apnea on AutoPap nightly, hypertension, hyperlipidemia, GERD, diverticulosis, diabetes, CHF, coronary artery disease status post four-vessel CABG and stenting, and arthritis presents to the emergency room with right lower quadrant/inguinal pain for the past 2 to 3 days with vomiting and diarrhea which began this day.  Patient denies any shortness of breath, chest pain, fevers, chills, or bodyaches.  He does report a cough 2 days ago after having excess water in his CPAP mask and per his wife she states that he continuously has to \"sling the water\" out of the mask.  Patient does tell me that he has normal bowel movements and passes flatus without difficulty.  He did have a bout of emesis this " morning which is what prompted to come to the emergency room secondary to right inguinal pain.  He states he notices a knot in the right inguinal area when he lifts things or pushes himself back in his recliner.  Denies any other complaints or concerns at this time.      Problems Addressed:  Pneumonia of left lower lobe due to infectious organism: complicated acute illness or injury  Sepsis, due to unspecified organism, unspecified whether acute organ dysfunction present: complicated acute illness or injury    Amount and/or Complexity of Data Reviewed  Labs: ordered. Decision-making details documented in ED Course.  Radiology: ordered. Decision-making details documented in ED Course.  Discussion of management or test interpretation with external provider(s): Daquan Campbell  Prescription drug management.  Decision regarding hospitalization.        Final diagnoses:   Pneumonia of left lower lobe due to infectious organism   Sepsis, due to unspecified organism, unspecified whether acute organ dysfunction present   Inguinal hernia, right       ED Disposition  ED Disposition       ED Disposition   Decision to Admit    Condition   --    Comment   --               No follow-up provider specified.       Medication List      No changes were made to your prescriptions during this visit.            Bella Vargas PA-C  05/12/24 1800

## 2024-05-13 ENCOUNTER — APPOINTMENT (OUTPATIENT)
Dept: CARDIOLOGY | Facility: HOSPITAL | Age: 69
End: 2024-05-13
Payer: OTHER GOVERNMENT

## 2024-05-13 LAB
ALBUMIN SERPL-MCNC: 3.1 G/DL (ref 3.5–5.2)
ALBUMIN/GLOB SERPL: 1 G/DL
ALP SERPL-CCNC: 86 U/L (ref 39–117)
ALT SERPL W P-5'-P-CCNC: 12 U/L (ref 1–41)
ANION GAP SERPL CALCULATED.3IONS-SCNC: 9.3 MMOL/L (ref 5–15)
AST SERPL-CCNC: 15 U/L (ref 1–40)
B PARAPERT DNA SPEC QL NAA+PROBE: NOT DETECTED
B PERT DNA SPEC QL NAA+PROBE: NOT DETECTED
BASOPHILS # BLD AUTO: 0.09 10*3/MM3 (ref 0–0.2)
BASOPHILS NFR BLD AUTO: 0.6 % (ref 0–1.5)
BH CV ECHO MEAS - ACS: 1.6 CM
BH CV ECHO MEAS - AO MAX PG: 6.7 MMHG
BH CV ECHO MEAS - AO MEAN PG: 3 MMHG
BH CV ECHO MEAS - AO ROOT DIAM: 3.6 CM
BH CV ECHO MEAS - AO V2 MAX: 129 CM/SEC
BH CV ECHO MEAS - AO V2 VTI: 31.2 CM
BH CV ECHO MEAS - EDV(CUBED): 185.2 ML
BH CV ECHO MEAS - EDV(MOD-SP4): 111 ML
BH CV ECHO MEAS - EF(MOD-SP4): 63.7 %
BH CV ECHO MEAS - ESV(CUBED): 13.8 ML
BH CV ECHO MEAS - ESV(MOD-SP4): 40.3 ML
BH CV ECHO MEAS - FS: 57.9 %
BH CV ECHO MEAS - IVS/LVPW: 0.78 CM
BH CV ECHO MEAS - IVSD: 0.7 CM
BH CV ECHO MEAS - LA DIMENSION: 4.4 CM
BH CV ECHO MEAS - LAT PEAK E' VEL: 6.3 CM/SEC
BH CV ECHO MEAS - LV DIASTOLIC VOL/BSA (35-75): 51 CM2
BH CV ECHO MEAS - LV MASS(C)D: 170.2 GRAMS
BH CV ECHO MEAS - LV SYSTOLIC VOL/BSA (12-30): 18.5 CM2
BH CV ECHO MEAS - LVIDD: 5.7 CM
BH CV ECHO MEAS - LVIDS: 2.4 CM
BH CV ECHO MEAS - LVOT AREA: 3.5 CM2
BH CV ECHO MEAS - LVOT DIAM: 2.1 CM
BH CV ECHO MEAS - LVPWD: 0.9 CM
BH CV ECHO MEAS - MED PEAK E' VEL: 6.4 CM/SEC
BH CV ECHO MEAS - MV A MAX VEL: 108 CM/SEC
BH CV ECHO MEAS - MV E MAX VEL: 138 CM/SEC
BH CV ECHO MEAS - MV E/A: 1.28
BH CV ECHO MEAS - PA ACC TIME: 0.1 SEC
BH CV ECHO MEAS - RAP SYSTOLE: 10 MMHG
BH CV ECHO MEAS - RVSP: 53 MMHG
BH CV ECHO MEAS - SV(MOD-SP4): 70.7 ML
BH CV ECHO MEAS - SVI(MOD-SP4): 32.5 ML/M2
BH CV ECHO MEAS - TAPSE (>1.6): 2.14 CM
BH CV ECHO MEAS - TR MAX PG: 43 MMHG
BH CV ECHO MEAS - TR MAX VEL: 328 CM/SEC
BH CV ECHO MEASUREMENTS AVERAGE E/E' RATIO: 21.73
BILIRUB SERPL-MCNC: 0.4 MG/DL (ref 0–1.2)
BUN SERPL-MCNC: 21 MG/DL (ref 8–23)
BUN/CREAT SERPL: 14.2 (ref 7–25)
C PNEUM DNA NPH QL NAA+NON-PROBE: NOT DETECTED
CALCIUM SPEC-SCNC: 8.5 MG/DL (ref 8.6–10.5)
CHLORIDE SERPL-SCNC: 108 MMOL/L (ref 98–107)
CO2 SERPL-SCNC: 21.7 MMOL/L (ref 22–29)
CREAT SERPL-MCNC: 1.48 MG/DL (ref 0.76–1.27)
CRP SERPL-MCNC: 11.8 MG/DL (ref 0–0.5)
DEPRECATED RDW RBC AUTO: 44.9 FL (ref 37–54)
EGFRCR SERPLBLD CKD-EPI 2021: 50.6 ML/MIN/1.73
EOSINOPHIL # BLD AUTO: 0.12 10*3/MM3 (ref 0–0.4)
EOSINOPHIL NFR BLD AUTO: 0.7 % (ref 0.3–6.2)
ERYTHROCYTE [DISTWIDTH] IN BLOOD BY AUTOMATED COUNT: 15.8 % (ref 12.3–15.4)
FLUAV SUBTYP SPEC NAA+PROBE: NOT DETECTED
FLUBV RNA ISLT QL NAA+PROBE: NOT DETECTED
GLOBULIN UR ELPH-MCNC: 3.2 GM/DL
GLUCOSE BLDC GLUCOMTR-MCNC: 134 MG/DL (ref 70–130)
GLUCOSE BLDC GLUCOMTR-MCNC: 140 MG/DL (ref 70–130)
GLUCOSE BLDC GLUCOMTR-MCNC: 145 MG/DL (ref 70–130)
GLUCOSE BLDC GLUCOMTR-MCNC: 157 MG/DL (ref 70–130)
GLUCOSE SERPL-MCNC: 203 MG/DL (ref 65–99)
HADV DNA SPEC NAA+PROBE: NOT DETECTED
HCOV 229E RNA SPEC QL NAA+PROBE: NOT DETECTED
HCOV HKU1 RNA SPEC QL NAA+PROBE: NOT DETECTED
HCOV NL63 RNA SPEC QL NAA+PROBE: NOT DETECTED
HCOV OC43 RNA SPEC QL NAA+PROBE: NOT DETECTED
HCT VFR BLD AUTO: 44.6 % (ref 37.5–51)
HGB BLD-MCNC: 13.7 G/DL (ref 13–17.7)
HMPV RNA NPH QL NAA+NON-PROBE: NOT DETECTED
HPIV1 RNA ISLT QL NAA+PROBE: NOT DETECTED
HPIV2 RNA SPEC QL NAA+PROBE: NOT DETECTED
HPIV3 RNA NPH QL NAA+PROBE: NOT DETECTED
HPIV4 P GENE NPH QL NAA+PROBE: NOT DETECTED
IMM GRANULOCYTES # BLD AUTO: 0.12 10*3/MM3 (ref 0–0.05)
IMM GRANULOCYTES NFR BLD AUTO: 0.7 % (ref 0–0.5)
LEFT ATRIUM VOLUME INDEX: 21.5 ML/M2
LYMPHOCYTES # BLD AUTO: 3.27 10*3/MM3 (ref 0.7–3.1)
LYMPHOCYTES NFR BLD AUTO: 20.1 % (ref 19.6–45.3)
M PNEUMO IGG SER IA-ACNC: NOT DETECTED
MCH RBC QN AUTO: 24.4 PG (ref 26.6–33)
MCHC RBC AUTO-ENTMCNC: 30.7 G/DL (ref 31.5–35.7)
MCV RBC AUTO: 79.4 FL (ref 79–97)
MONOCYTES # BLD AUTO: 1.93 10*3/MM3 (ref 0.1–0.9)
MONOCYTES NFR BLD AUTO: 11.9 % (ref 5–12)
NEUTROPHILS NFR BLD AUTO: 10.72 10*3/MM3 (ref 1.7–7)
NEUTROPHILS NFR BLD AUTO: 66 % (ref 42.7–76)
NRBC BLD AUTO-RTO: 0 /100 WBC (ref 0–0.2)
PLATELET # BLD AUTO: 190 10*3/MM3 (ref 140–450)
PMV BLD AUTO: 10.1 FL (ref 6–12)
POTASSIUM SERPL-SCNC: 4.3 MMOL/L (ref 3.5–5.2)
PROT SERPL-MCNC: 6.3 G/DL (ref 6–8.5)
RBC # BLD AUTO: 5.62 10*6/MM3 (ref 4.14–5.8)
RHINOVIRUS RNA SPEC NAA+PROBE: NOT DETECTED
RSV RNA NPH QL NAA+NON-PROBE: NOT DETECTED
S PNEUM AG SPEC QL LA: NEGATIVE
SARS-COV-2 RNA NPH QL NAA+NON-PROBE: NOT DETECTED
SODIUM SERPL-SCNC: 139 MMOL/L (ref 136–145)
WBC NRBC COR # BLD AUTO: 16.25 10*3/MM3 (ref 3.4–10.8)

## 2024-05-13 PROCEDURE — 80053 COMPREHEN METABOLIC PANEL: CPT | Performed by: HOSPITALIST

## 2024-05-13 PROCEDURE — 92610 EVALUATE SWALLOWING FUNCTION: CPT | Performed by: SPEECH-LANGUAGE PATHOLOGIST

## 2024-05-13 PROCEDURE — 63710000001 INSULIN GLARGINE PER 5 UNITS: Performed by: HOSPITALIST

## 2024-05-13 PROCEDURE — 25010000002 ENOXAPARIN PER 10 MG: Performed by: STUDENT IN AN ORGANIZED HEALTH CARE EDUCATION/TRAINING PROGRAM

## 2024-05-13 PROCEDURE — 85025 COMPLETE CBC W/AUTO DIFF WBC: CPT | Performed by: HOSPITALIST

## 2024-05-13 PROCEDURE — 93306 TTE W/DOPPLER COMPLETE: CPT | Performed by: INTERNAL MEDICINE

## 2024-05-13 PROCEDURE — 63710000001 INSULIN LISPRO (HUMAN) PER 5 UNITS: Performed by: HOSPITALIST

## 2024-05-13 PROCEDURE — 86140 C-REACTIVE PROTEIN: CPT | Performed by: HOSPITALIST

## 2024-05-13 PROCEDURE — 82948 REAGENT STRIP/BLOOD GLUCOSE: CPT

## 2024-05-13 PROCEDURE — 25010000002 METRONIDAZOLE 500 MG/100ML SOLUTION: Performed by: HOSPITALIST

## 2024-05-13 PROCEDURE — 0202U NFCT DS 22 TRGT SARS-COV-2: CPT | Performed by: HOSPITALIST

## 2024-05-13 PROCEDURE — 93306 TTE W/DOPPLER COMPLETE: CPT

## 2024-05-13 PROCEDURE — 99232 SBSQ HOSP IP/OBS MODERATE 35: CPT | Performed by: STUDENT IN AN ORGANIZED HEALTH CARE EDUCATION/TRAINING PROGRAM

## 2024-05-13 PROCEDURE — 25010000002 CEFTRIAXONE PER 250 MG: Performed by: HOSPITALIST

## 2024-05-13 PROCEDURE — 25810000003 SODIUM CHLORIDE 0.9 % SOLUTION: Performed by: HOSPITALIST

## 2024-05-13 RX ORDER — GABAPENTIN 300 MG/1
300 CAPSULE ORAL 3 TIMES DAILY PRN
Status: DISCONTINUED | OUTPATIENT
Start: 2024-05-13 | End: 2024-05-14 | Stop reason: HOSPADM

## 2024-05-13 RX ORDER — ACETAMINOPHEN AND CODEINE PHOSPHATE 300; 30 MG/1; MG/1
2 TABLET ORAL ONCE AS NEEDED
Status: COMPLETED | OUTPATIENT
Start: 2024-05-13 | End: 2024-05-13

## 2024-05-13 RX ORDER — TAMSULOSIN HYDROCHLORIDE 0.4 MG/1
0.4 CAPSULE ORAL NIGHTLY
Status: DISCONTINUED | OUTPATIENT
Start: 2024-05-13 | End: 2024-05-14 | Stop reason: HOSPADM

## 2024-05-13 RX ORDER — METOPROLOL TARTRATE 50 MG/1
50 TABLET, FILM COATED ORAL NIGHTLY
Status: DISCONTINUED | OUTPATIENT
Start: 2024-05-13 | End: 2024-05-14 | Stop reason: HOSPADM

## 2024-05-13 RX ORDER — ATORVASTATIN CALCIUM 20 MG/1
20 TABLET, FILM COATED ORAL DAILY
Status: DISCONTINUED | OUTPATIENT
Start: 2024-05-13 | End: 2024-05-14 | Stop reason: HOSPADM

## 2024-05-13 RX ORDER — MELATONIN
1000 DAILY
Status: DISCONTINUED | OUTPATIENT
Start: 2024-05-13 | End: 2024-05-14 | Stop reason: HOSPADM

## 2024-05-13 RX ORDER — METRONIDAZOLE 250 MG/1
500 TABLET ORAL EVERY 8 HOURS SCHEDULED
Status: DISCONTINUED | OUTPATIENT
Start: 2024-05-13 | End: 2024-05-14 | Stop reason: HOSPADM

## 2024-05-13 RX ORDER — ACETAMINOPHEN 325 MG/1
650 TABLET ORAL EVERY 6 HOURS PRN
Status: DISCONTINUED | OUTPATIENT
Start: 2024-05-13 | End: 2024-05-14 | Stop reason: HOSPADM

## 2024-05-13 RX ORDER — PANTOPRAZOLE SODIUM 40 MG/1
40 TABLET, DELAYED RELEASE ORAL
Status: DISCONTINUED | OUTPATIENT
Start: 2024-05-14 | End: 2024-05-14 | Stop reason: HOSPADM

## 2024-05-13 RX ORDER — FINASTERIDE 5 MG/1
5 TABLET, FILM COATED ORAL DAILY
Status: DISCONTINUED | OUTPATIENT
Start: 2024-05-13 | End: 2024-05-14 | Stop reason: HOSPADM

## 2024-05-13 RX ORDER — ASPIRIN 81 MG/1
81 TABLET ORAL DAILY
Status: DISCONTINUED | OUTPATIENT
Start: 2024-05-13 | End: 2024-05-14 | Stop reason: HOSPADM

## 2024-05-13 RX ORDER — BACLOFEN 10 MG/1
10 TABLET ORAL 3 TIMES DAILY PRN
Status: DISCONTINUED | OUTPATIENT
Start: 2024-05-13 | End: 2024-05-14 | Stop reason: HOSPADM

## 2024-05-13 RX ORDER — ISOSORBIDE MONONITRATE 30 MG/1
60 TABLET, EXTENDED RELEASE ORAL DAILY
Status: DISCONTINUED | OUTPATIENT
Start: 2024-05-13 | End: 2024-05-14 | Stop reason: HOSPADM

## 2024-05-13 RX ADMIN — SERTRALINE 100 MG: 50 TABLET, FILM COATED ORAL at 16:16

## 2024-05-13 RX ADMIN — DOXYCYCLINE 100 MG: 100 INJECTION, POWDER, LYOPHILIZED, FOR SOLUTION INTRAVENOUS at 09:15

## 2024-05-13 RX ADMIN — ENOXAPARIN SODIUM 40 MG: 40 INJECTION SUBCUTANEOUS at 11:24

## 2024-05-13 RX ADMIN — ACETAMINOPHEN AND CODEINE PHOSPHATE 2 TABLET: 300; 30 TABLET ORAL at 22:35

## 2024-05-13 RX ADMIN — MAGNESIUM GLUCONATE 500 MG ORAL TABLET 400 MG: 500 TABLET ORAL at 16:15

## 2024-05-13 RX ADMIN — METRONIDAZOLE 500 MG: 500 INJECTION, SOLUTION INTRAVENOUS at 04:57

## 2024-05-13 RX ADMIN — INSULIN GLARGINE 40 UNITS: 100 INJECTION, SOLUTION SUBCUTANEOUS at 21:49

## 2024-05-13 RX ADMIN — ISOSORBIDE MONONITRATE 60 MG: 30 TABLET, EXTENDED RELEASE ORAL at 16:16

## 2024-05-13 RX ADMIN — ATORVASTATIN CALCIUM 20 MG: 20 TABLET, FILM COATED ORAL at 16:15

## 2024-05-13 RX ADMIN — CEFTRIAXONE 1000 MG: 1 INJECTION, POWDER, FOR SOLUTION INTRAMUSCULAR; INTRAVENOUS at 21:30

## 2024-05-13 RX ADMIN — METOPROLOL TARTRATE 50 MG: 50 TABLET, FILM COATED ORAL at 21:30

## 2024-05-13 RX ADMIN — METRONIDAZOLE 500 MG: 250 TABLET ORAL at 16:16

## 2024-05-13 RX ADMIN — DOXYCYCLINE 100 MG: 100 INJECTION, POWDER, LYOPHILIZED, FOR SOLUTION INTRAVENOUS at 21:31

## 2024-05-13 RX ADMIN — SODIUM CHLORIDE 75 ML/HR: 9 INJECTION, SOLUTION INTRAVENOUS at 14:11

## 2024-05-13 RX ADMIN — INSULIN LISPRO 2 UNITS: 100 INJECTION, SOLUTION INTRAVENOUS; SUBCUTANEOUS at 11:23

## 2024-05-13 RX ADMIN — EMPAGLIFLOZIN 25 MG: 10 TABLET, FILM COATED ORAL at 16:15

## 2024-05-13 RX ADMIN — METRONIDAZOLE 500 MG: 250 TABLET ORAL at 21:30

## 2024-05-13 RX ADMIN — Medication 1000 UNITS: at 16:16

## 2024-05-13 RX ADMIN — ASPIRIN 81 MG: 81 TABLET, COATED ORAL at 16:16

## 2024-05-13 RX ADMIN — Medication 10 ML: at 21:30

## 2024-05-13 RX ADMIN — TAMSULOSIN HYDROCHLORIDE 0.4 MG: 0.4 CAPSULE ORAL at 21:30

## 2024-05-13 RX ADMIN — GABAPENTIN 300 MG: 300 CAPSULE ORAL at 21:30

## 2024-05-13 RX ADMIN — ACETAMINOPHEN 650 MG: 325 TABLET ORAL at 02:34

## 2024-05-13 RX ADMIN — FINASTERIDE 5 MG: 5 TABLET, FILM COATED ORAL at 16:16

## 2024-05-13 NOTE — PLAN OF CARE
Patient resting in the bed throughout the night. No s/s of distress noted. Patient complaints of pain, S.Ranjana PLASCENCIA aware (see MAR). Will continue to follow care plan.

## 2024-05-13 NOTE — THERAPY EVALUATION
Acute Care - Speech Language Pathology   Swallow Initial Evaluation [facility]  Clinical Dysphagia Assessment     Patient Name: Abraham Blanco  : 1955  MRN: [id number]  Today's Date: 2024               Admit Date: 2024    Visit Dx:     ICD-10-CM ICD-9-CM   1. Pneumonia of left lower lobe due to infectious organism  J18.9 486   2. Sepsis, due to unspecified organism, unspecified whether acute organ dysfunction present  A41.9 038.9     995.91   3. Inguinal hernia, right  K40.90 550.90     Patient Active Problem List   Diagnosis    Prostate cancer    Acute pancreatitis without infection or necrosis    Rotator cuff tear, non-traumatic, left    Nontraumatic complete tear of left rotator cuff    Status post left rotator cuff repair    Arthritis of shoulder region, left    Pseudoparalysis    S/P left rotator cuff repair    Acute respiratory failure with hypoxemia     Past Medical History:   Diagnosis Date    Arthritis of back     Arthritis of neck     CAD (coronary artery disease)     s/p 4-vessel CABG and stenting    CHF (congestive heart failure)     Diabetes mellitus     Diverticula of colon     Elevated PSA     Frozen shoulder     GERD (gastroesophageal reflux disease)     Heart disease     Hip arthrosis     Hyperlipidemia     Hypertension     Knee swelling     Neck strain     CHANEL (obstructive sleep apnea)     on AutoPAP     Past Surgical History:   Procedure Laterality Date    CARDIAC SURGERY      4 vessel bypass    NECK SURGERY      SHOULDER ARTHROSCOPY W/ ROTATOR CUFF REPAIR Left 2023    Procedure: LEFT SHOULDER ARTHROSCOPY WITH MINI OPEN ROTATOR CUFF REPAIR, ACROMIOPLASTY, EXCISION LATERAL CLAVICLE;  Surgeon: Steve Serna MD;  Location: [facility];  Service: Orthopedics;  Laterality: Left;    SHOULDER SURGERY      TONSILLECTOMY       Abraham Blanco was seen at bedside this PM on 3S Rm. 3311-1s to assess safety/efficacy of swallowing fnx, determine safest/least restrictive  "diet tolerance.     Social History     Socioeconomic History    Marital status:    Tobacco Use    Smoking status: Former     Current packs/day: 0.00     Average packs/day: 2.0 packs/day for 25.2 years (50.4 ttl pk-yrs)     Types: Cigarettes     Start date: 10/11/1971     Quit date: 1997     Years since quittin.3    Smokeless tobacco: Never   Vaping Use    Vaping status: Never Used   Substance and Sexual Activity    Alcohol use: No    Drug use: No    Sexual activity: Not Currently     Partners: Female      Per report: \"Patient is a 68 y.o. male with history of CAD s/p CABG x4 vessel, CHF unspecified, insulin dependent type II DM, GERD, HTN, HLD, and CHANEL on CPAP, who presents with complaints of nausea, vomiting, diarrhea and RLQ abdominal pain. He reports his GI symptoms have been ongoing for the last couple days. This morning he developed rigors and his wife forced him to come to the ED to be evaluated further. Upon further questioning, he reports a productive cough the last few days, bringing up thick gray sputum. He also reports some pleuritic left side chest pain worse with cough or inspiration. He denies shortness of breath however. He reports low grade fever just below 100 earlier today. He denies lower extremity edema. In the ED,  patient was noted to be mildly tachycardic (HRmax 97) and mildly hypoxic (O2 sat 89%) on RA. ABG confirmed hypoxemia/hypoxia with PO2 50 and O2 sat 86% on RA. Troponin was 40, BNP normal at 451, bicarb mildly low at 21, BUN 20, Cr 1.40 (baseline around 1.0), glucose 128, CRP 9.31, procal 1.34, lactate normal, WBC 20.22. UA showed 11-20 WBC but no bacteria, leuk esterase or nitrites. Patient denies dysuria or foul smelling urine. CT abdomen/pelvis was significant for left lower lobe infection vs aspiration. Upon further questioning, patient denies choking on emesis in recent days. He has had increased condensation in his bipap tubing, but then he recalls he just " "installed a humidifier on his bipap machine which is likely the reason for this.  \"    Imaging:  Study Result    Narrative & Impression   Procedure: Frontal view of chest obtained.     Comparison: None available     History: sepsis     Findings:     Reticular opacity in left mid to lower lung.   Normal heart size and mediastinal contours.  Trachea is in midline position.  No edema or effusion is seen.  There is no evidence of pneumothorax.     IMPRESSION:     Left lower lung infection     This report was finalized on 5/12/2024 6:10 PM by Melody Urias MD.        Labs:  Sodium  139  05/13 0104  Potassium  4.3  05/13 0104  Chloride  108  05/13 0104  CO2  21.7  05/13 0104  BUN  21  05/13 0104  Creatinine  1.48  05/13 0104  Glucose  157  05/13 1015  Hemoglobin  13.7  05/13 0105  Hematocrit  44.6  05/13 0105  WBC  16.25  05/13 0105  Platelets  190  05/13 0105  Diet Orders (active) (From admission, onward)       Start     Ordered    05/12/24 1959  Diet: Cardiac, Diabetic; Healthy Heart (2-3 Na+); Consistent Carbohydrate; Fluid Consistency: Thin (IDDSI 0)  Diet Effective Now         05/12/24 1958                  Pt is observed on 2L O2 via nasal cannula.    Patient was positioned upright and centered in bed to accept multiple po presentations of ice chips, solid cracker, puree, and thin liquids via spoon, cup, and straw.  Patient was able to self provide po trials.     Facial/oral structures were symmetrical upon observation. Lingual protrusion revealed no deviation. Oral mucosa were moist, pink, and clean. Secretions were clear, thin, and well controlled. OROM/LUIS E was wfl to imitate oral postures. Gag is not assessed. Volitional cough was intact w/ adequate intensity, clear in quality, non-productive. Voice was adequate in intensity, slightly hoarse in quality w/ intelligible speech. Pt states recent emesis events leading to \"sore throat\".    Upon po presentations, adequate bolus anticipation and acceptance w/ good " labial seal for bolus clearance via spoon bowl, cup rim stability and suction via straw. Bolus formation, manipulation and control were wfl w/ rotary mastication pattern. A-p transit was timely w/o significant oral residue appreciated. No overt s/s aspiration before the swallow.      Pharyngeal swallow was timely w/ adequate hyolaryngeal elevation per palpation. No overt s/s aspiration evidenced across this evaluation. No silent aspiration suspected. Patient denied odynophagia.    Impression: Patient presented w/ wfl oropharyngeal swallow w/o s/s aspiration.No s/s indicative of silent aspiration. No odynophagia reported.      SLP Recommendation and Plan     1. Regular consistencies, thin liquids.    2. Medications whole in puree/thins.   3. Upright and centered for all po intake.  4. ALVARO precautions.  5. Oral care protocol.    No further formal SLP f/u warranted/recommended at this time.    D/w patient results and recommendations w/ verbal agreement.    Thank you for allowing me to participate in the care of your patient-  Marisol Kuhn M.A., CCC-SLP   EDUCATION  The patient has been educated in the following areas:   Oral Care/Hydration.   Time Calculation:     Therapy Charges for Today       Code Description Service Date Service Provider Modifiers Qty    [id number]  ST EVAL ORAL PHARYNG SWALLOW 4 5/13/2024 Marisol Kuhn MA,CCC-SLP GN 1            Marisol Kuhn MA,CCC-SLP  5/13/2024

## 2024-05-13 NOTE — CONSULTS
"Diabetes Education  Assessment/Teaching    Patient Name:  Abraham Blanco  YOB: 1955  MRN: [id number]  Admit Date:  5/12/2024      Assessment Date:  5/13/2024  Flowsheet Row Most Recent Value   General Information     Height 172.7 cm (68\")   Height Method Stated   Weight 105 kg (232 lb 8 oz)   Weight Method --  [new addmitt.]   Pregnancy Assessment    Diabetes History    Education Preferences    Nutrition Information    Assessment Topics    DM Goals             Flowsheet Row Most Recent Value   DM Education Needs    Meter Has own   Frequency of Testing 2 times a day   Medication Insulin   Problem Solving Hypoglycemia, Hyperglycemia, Sick days, Signs, Symptoms, Treatment   Reducing Risks Cardiovascular, Immunizations, Foot care, Dental exam, Eye exam, Blood pressure, Lipids, A1C testing, Neuropathy, Retinopathy   Healthy Eating Basic meal plan provided   Physical Activity Walking   Physical Activity Frequency Occasionally   Healthy Coping Appropriate   Discharge Plan Follow-up with PCP   Motivation Moderate   Teaching Method Explanation, Discussion, Handouts   Patient Response Verbalized understanding              Other Comments:  A1C 8.5 Patient was educated and received AADE7 and ADA handouts on diet, activity, checking blood glucose, taking medication as prescribed, checking feet daily and S/S of hypo/hyperglycemia. Patient was educated on sick rule days. Patient had no questions or concerns. Please re-consult or call for concerns or questions. Thank you.        Electronically signed by:  Ofe Vides RN  05/13/24 15:50 EDT  "

## 2024-05-13 NOTE — PLAN OF CARE
Goal Outcome Evaluation: Pt has rested periodically throughout shift. Wife has visited pt this shift & assisted pt with a shower. Pt has been weaned off of 02 this shift; 02 sat 95% on RA. Will continue to monitor & follow plan of care.

## 2024-05-13 NOTE — H&P
Hospitalist History and Physical        Patient Identification  Name: Abraham Blanco  Age/Sex: 68 y.o. male  :  1955        MRN: [id number]  Visit Number: [id number]  Admit Date: 2024   PCP: Estrada Teixeira PA        Chief complaint abdominal pain, nausea, vomiting, diarrhea    History of Present Illness:  Patient is a 68 y.o. male with history of CAD s/p CABG x4 vessel, CHF unspecified, insulin dependent type II DM, GERD, HTN, HLD, and CHANEL on CPAP, who presents with complaints of nausea, vomiting, diarrhea and RLQ abdominal pain. He reports his GI symptoms have been ongoing for the last couple days. This morning he developed rigors and his wife forced him to come to the ED to be evaluated further. Upon further questioning, he reports a productive cough the last few days, bringing up thick gray sputum. He also reports some pleuritic left side chest pain worse with cough or inspiration. He denies shortness of breath however. He reports low grade fever just below 100 earlier today. He denies lower extremity edema. In the ED,  patient was noted to be mildly tachycardic (HRmax 97) and mildly hypoxic (O2 sat 89%) on RA. ABG confirmed hypoxemia/hypoxia with PO2 50 and O2 sat 86% on RA. Troponin was 40, BNP normal at 451, bicarb mildly low at 21, BUN 20, Cr 1.40 (baseline around 1.0), glucose 128, CRP 9.31, procal 1.34, lactate normal, WBC 20.22. UA showed 11-20 WBC but no bacteria, leuk esterase or nitrites. Patient denies dysuria or foul smelling urine. CT abdomen/pelvis was significant for left lower lobe infection vs aspiration. Upon further questioning, patient denies choking on emesis in recent days. He has had increased condensation in his bipap tubing, but then he recalls he just installed a humidifier on his bipap machine which is likely the reason for this.      Review of Systems  Review of Systems   Constitutional:  Positive for activity change, chills, fatigue and fever. Negative for  appetite change, diaphoresis and unexpected weight change.   HENT:  Negative for congestion, postnasal drip, rhinorrhea, sinus pressure, sinus pain and sore throat.    Eyes:  Negative for photophobia and visual disturbance.   Respiratory:  Positive for cough. Negative for shortness of breath and wheezing.    Cardiovascular:  Positive for chest pain (pleuritic, associated with cough and inspiration).   Gastrointestinal:  Positive for abdominal pain (RLQ), diarrhea, nausea and vomiting. Negative for abdominal distention and constipation.   Genitourinary:  Negative for difficulty urinating, dysuria, flank pain, frequency and hematuria.   Musculoskeletal:  Negative for arthralgias, back pain, joint swelling, myalgias, neck pain and neck stiffness.   Skin:  Negative for color change, pallor, rash and wound.   Neurological:  Positive for tremors (sound like rigors the way he describds them, just started earlier this morning). Negative for dizziness, seizures, syncope, weakness, light-headedness, numbness and headaches.   Hematological:  Negative for adenopathy. Does not bruise/bleed easily.   Psychiatric/Behavioral:  Negative for agitation, behavioral problems and confusion.        History  Past Medical History:   Diagnosis Date    Arthritis of back     Arthritis of neck     CAD (coronary artery disease)     s/p 4-vessel CABG and stenting    CHF (congestive heart failure)     Diabetes mellitus     Diverticula of colon     Elevated PSA     Frozen shoulder     GERD (gastroesophageal reflux disease)     Heart disease     Hip arthrosis     Hyperlipidemia     Hypertension     Knee swelling     Neck strain     CHANEL (obstructive sleep apnea)     on AutoPAP     Past Surgical History:   Procedure Laterality Date    CARDIAC SURGERY  2011    4 vessel bypass    NECK SURGERY      SHOULDER ARTHROSCOPY W/ ROTATOR CUFF REPAIR Left 11/07/2023    Procedure: LEFT SHOULDER ARTHROSCOPY WITH MINI OPEN ROTATOR CUFF REPAIR, ACROMIOPLASTY,  EXCISION LATERAL CLAVICLE;  Surgeon: Steve Serna MD;  Location: [facility];  Service: Orthopedics;  Laterality: Left;    SHOULDER SURGERY      TONSILLECTOMY       Family History   Problem Relation Age of Onset    Diabetes Mother     Hypertension Mother     Cancer Mother     Hypertension Father      Social History     Tobacco Use    Smoking status: Former     Current packs/day: 0.00     Average packs/day: 2.0 packs/day for 25.2 years (50.4 ttl pk-yrs)     Types: Cigarettes     Start date: 10/11/1971     Quit date: 1997     Years since quittin.3    Smokeless tobacco: Never   Vaping Use    Vaping status: Never Used   Substance Use Topics    Alcohol use: No    Drug use: No     Medications Prior to Admission   Medication Sig Dispense Refill Last Dose    aspirin 81 MG EC tablet Take 1 tablet by mouth Daily.       atorvastatin (LIPITOR) 20 MG tablet Take 1 tablet by mouth Daily.       baclofen (LIORESAL) 10 MG tablet Take 1 tablet by mouth 3 (Three) Times a Day As Needed for Muscle Spasms.       docusate sodium (Colace) 100 MG capsule Take 1 capsule by mouth 2 (Two) Times a Day. 20 capsule 0     finasteride (PROSCAR) 5 MG tablet Take 1 tablet by mouth Daily.       gabapentin (NEURONTIN) 300 MG capsule Take 1 capsule by mouth 3 (Three) Times a Day As Needed.       insulin glargine (LANTUS, SEMGLEE) 100 UNIT/ML injection Inject 80 Units under the skin into the appropriate area as directed Every Night.       isosorbide mononitrate (IMDUR) 60 MG 24 hr tablet Take 1 tablet by mouth Daily.       Jardiance 25 MG tablet tablet        lisinopril (PRINIVIL,ZESTRIL) 10 MG tablet Take 1 tablet by mouth Daily.       metoprolol succinate XL (TOPROL-XL) 50 MG 24 hr tablet Take 1 tablet by mouth Daily.       NovoLIN R FlexPen 100 UNIT/ML injection        oxyCODONE-acetaminophen (PERCOCET) 5-325 MG per tablet Take 1 tablet by mouth Every 4 (Four) Hours As Needed for Moderate Pain. 30 tablet 0     pantoprazole (PROTONIX)  40 MG EC tablet Take 1 tablet by mouth Daily.       sertraline (ZOLOFT) 100 MG tablet Take 1 tablet by mouth Daily.       tamsulosin (FLOMAX) 0.4 MG capsule 24 hr capsule Take 1 capsule by mouth Daily.        Allergies:  Phenergan [promethazine]    Objective     Vital Signs  Temp:  [99.7 °F (37.6 °C)] 99.7 °F (37.6 °C)  Heart Rate:  [57-97] 59  Resp:  [17] 17  BP: (101-129)/(41-68) 116/50  Body mass index is 34.97 kg/m².    Physical Exam:  Physical Exam  Constitutional:       General: He is not in acute distress.     Appearance: He is obese. He is ill-appearing.   HENT:      Head: Normocephalic and atraumatic.      Right Ear: External ear normal.      Left Ear: External ear normal.      Nose: Nose normal.      Mouth/Throat:      Mouth: Mucous membranes are moist.      Pharynx: Oropharynx is clear.   Eyes:      Extraocular Movements: Extraocular movements intact.      Conjunctiva/sclera: Conjunctivae normal.      Pupils: Pupils are equal, round, and reactive to light.   Cardiovascular:      Rate and Rhythm: Regular rhythm. Bradycardia present.      Pulses: Normal pulses.      Heart sounds: Normal heart sounds. No murmur heard.  Pulmonary:      Effort: Pulmonary effort is normal.      Breath sounds: Normal breath sounds. Rhonchi (left lower lung zone. Pain elicited in left side of chest with deep inspiration.) present.   Abdominal:      General: Abdomen is flat. Bowel sounds are normal. There is no distension.      Palpations: Abdomen is soft.      Tenderness: There is no abdominal tenderness.   Musculoskeletal:         General: Normal range of motion.      Cervical back: Normal range of motion and neck supple. No tenderness.      Right lower leg: Edema (trace) present.      Left lower leg: Edema (trace) present.   Lymphadenopathy:      Cervical: No cervical adenopathy.   Skin:     General: Skin is warm and dry.      Capillary Refill: Capillary refill takes less than 2 seconds.      Coloration: Skin is not jaundiced.  "     Findings: No bruising or lesion.   Neurological:      General: No focal deficit present.      Mental Status: He is alert and oriented to person, place, and time.   Psychiatric:         Mood and Affect: Mood normal.         Behavior: Behavior normal.           Results Review:       Lab Results:  Results from last 7 days   Lab Units 05/12/24  1441   WBC 10*3/mm3 20.22*   HEMOGLOBIN g/dL 15.6   PLATELETS 10*3/mm3 202     Results from last 7 days   Lab Units 05/12/24  1441   CRP mg/dL 9.31*     Results from last 7 days   Lab Units 05/12/24  1441   SODIUM mmol/L 138   POTASSIUM mmol/L 4.3   CHLORIDE mmol/L 104   CO2 mmol/L 21.3*   BUN mg/dL 20   CREATININE mg/dL 1.40*   CALCIUM mg/dL 9.5   GLUCOSE mg/dL 128*         No results found for: \"HGBA1C\"  Results from last 7 days   Lab Units 05/12/24  1441   BILIRUBIN mg/dL 0.7   ALK PHOS U/L 103   AST (SGOT) U/L 18   ALT (SGPT) U/L 14     Results from last 7 days   Lab Units 05/12/24  1441   HSTROP T ng/L 40*             Results from last 7 days   Lab Units 05/12/24  1607   PH, ARTERIAL pH units 7.430   PO2 ART mm Hg 50.2*   PCO2, ARTERIAL mm Hg 36.1   HCO3 ART mmol/L 23.9       I have reviewed the patient's laboratory results.    Imaging:  Imaging Results (Last 72 Hours)       Procedure Component Value Units Date/Time    XR Chest 1 View [[id number]] Collected: 05/12/24 1809     Updated: 05/12/24 1812    Narrative:      Procedure: Frontal view of chest obtained.     Comparison: None available     History: sepsis     Findings:     Reticular opacity in left mid to lower lung.   Normal heart size and mediastinal contours.  Trachea is in midline position.  No edema or effusion is seen.  There is no evidence of pneumothorax.       Impression:         Left lower lung infection     This report was finalized on 5/12/2024 6:10 PM by Melody Urias MD.       CT Abdomen Pelvis With Contrast [[id number]] Collected: 05/12/24 1615     Updated: 05/12/24 1619    Narrative:      Procedure: " Helical CT Abdomen and Pelvis examination performed with  axial sections after administration of non-ionic IV contrast. Coronal  and sagittal 3 mm thick reformats also acquired. CT scan performed  according to ALARA(as low as reasonably achievable)dose protocol.     Comparison: None available.     History: abd pain     Date: 5/12/2024 3:36 PM     Findings:     Liver: Heaptic steatosis.  No focal lesion.  Gallbladder: Normal. No evidence of stones.  Spleen: Normal in size.  Pancreas: Normal. No surrounding edema.  Adrenal glands: No nodules.  Kidneys: non-specific bilateral hypodensities. Symmetric bilateral renal  enhancement is present. No hydronephrosis.  Peritoneum: There is no free air or abscess.  Bowel: No obstruction. No evidence of inflamed bowel loops. Appendix is  normal.  Mesentery: No adenopathy.  Retroperitoneum: Aorta and inferior vena cava unremarkable.  Pelvis: Bladder is unremarkable. Right inguinal fat containing hernia   Bones: No acute fracture.  Lung bases: Left lower lobe consolidation. Heart is normal in size.       Impression:         Left lower lobe infection versus aspiration.      This report was finalized on 5/12/2024 4:17 PM by Melody Urias MD.               I have personally reviewed the patient's radiologic imaging.        EKG: ordered, results pending        Assessment & Plan     - Acute respiratory failure with hypoxemia, secondary to left lower lobe pneumonia: suspect aspiration in etiology give recent nausea/vomiting and possibly could have silent aspiration from increased moisture/water in bipap from new humidifier. Treat with IV rocephin, doxycycline and flagyl for now. Continue 2L NC, wean as patient tolerates.   - Sepsis, present on admission with tachycardia and leukocytosis, secondary to left lower lobe pneumonia: treat with antibiotics noted above. Follow up blood and respiratory cultures. Check respiratory PCR. Continue to trend WBC and CRP. Reasons for concern for  aspiration noted above, but will consult SLP to perform formal swallow eval to ensure patient does not have any underlying dysphagia as well.   - Acute renal insufficiency: creatinine 1.40, up from baseline of around 1.0. Hydrate with IV fluids but gently as patient has underlying CHF of unknown severity. Avoid nephrotoxic agents. Repeat labs in the morning.   - Troponin elevation: suspect type II from hypoxia, sepsis and acute renal insufficiency. Chest pain described is pleuritic. EKG and reflex troponin pending. Obtain ECHO in the morning to assess severity of underlying CHF and to further evaluate troponin elevation.  - CHF, unspecified: does not appear to be in exacerbation at this time. Gently hydrate with IV fluids as noted above, monitor for signs/symptoms of developing fluid overload. Obtain ECHO in the morning as noted above.   - Type II DM, insulin dependent: patient takes 80 units lantus and SSI insulin at home. Usually only has to take about 8 units sliding scale. Since renal function is impaired from baseline and he's not been eating as much due to GI symptoms, will cut lantus in half to 40 units. SSI up to 9 units max also ordered. Patient is currently not nauseated and requesting food. Adjust insulin coverage as needed moving forward.     DVT Prophylaxis: SQ loevnox    Estimated Length of Stay >2 midnights    I discussed the patient's findings, assessment and plan with the patient, his wife at bedside, and hospitalist Dr Butt who accepted the patient from the ED prior to shift change.    Patient is high risk due to acute hypoxic respiratory failure, sepsis due to left lower lobe pneumonia, acute renal insufficiency, troponin elevation    Selwyn Chen MD  05/12/24  20:24 EDT

## 2024-05-14 VITALS
BODY MASS INDEX: 35.65 KG/M2 | OXYGEN SATURATION: 94 % | DIASTOLIC BLOOD PRESSURE: 61 MMHG | TEMPERATURE: 98.2 F | HEART RATE: 73 BPM | WEIGHT: 235.23 LBS | SYSTOLIC BLOOD PRESSURE: 130 MMHG | HEIGHT: 68 IN | RESPIRATION RATE: 18 BRPM

## 2024-05-14 LAB
ADV 40+41 DNA STL QL NAA+NON-PROBE: NOT DETECTED
ANION GAP SERPL CALCULATED.3IONS-SCNC: 10.8 MMOL/L (ref 5–15)
ASTRO TYP 1-8 RNA STL QL NAA+NON-PROBE: NOT DETECTED
BUN SERPL-MCNC: 18 MG/DL (ref 8–23)
BUN/CREAT SERPL: 11.8 (ref 7–25)
C CAYETANENSIS DNA STL QL NAA+NON-PROBE: NOT DETECTED
C COLI+JEJ+UPSA DNA STL QL NAA+NON-PROBE: NOT DETECTED
CALCIUM SPEC-SCNC: 9 MG/DL (ref 8.6–10.5)
CHLORIDE SERPL-SCNC: 108 MMOL/L (ref 98–107)
CO2 SERPL-SCNC: 21.2 MMOL/L (ref 22–29)
CREAT SERPL-MCNC: 1.52 MG/DL (ref 0.76–1.27)
CRYPTOSP DNA STL QL NAA+NON-PROBE: NOT DETECTED
DEPRECATED RDW RBC AUTO: 44.3 FL (ref 37–54)
E HISTOLYT DNA STL QL NAA+NON-PROBE: NOT DETECTED
EAEC PAA PLAS AGGR+AATA ST NAA+NON-PRB: NOT DETECTED
EC STX1+STX2 GENES STL QL NAA+NON-PROBE: NOT DETECTED
EGFRCR SERPLBLD CKD-EPI 2021: 49 ML/MIN/1.73
EPEC EAE GENE STL QL NAA+NON-PROBE: NOT DETECTED
ERYTHROCYTE [DISTWIDTH] IN BLOOD BY AUTOMATED COUNT: 15.5 % (ref 12.3–15.4)
ETEC LTA+ST1A+ST1B TOX ST NAA+NON-PROBE: NOT DETECTED
G LAMBLIA DNA STL QL NAA+NON-PROBE: NOT DETECTED
GLUCOSE BLDC GLUCOMTR-MCNC: 71 MG/DL (ref 70–130)
GLUCOSE SERPL-MCNC: 119 MG/DL (ref 65–99)
HCT VFR BLD AUTO: 44.6 % (ref 37.5–51)
HGB BLD-MCNC: 13.7 G/DL (ref 13–17.7)
MCH RBC QN AUTO: 24.5 PG (ref 26.6–33)
MCHC RBC AUTO-ENTMCNC: 30.7 G/DL (ref 31.5–35.7)
MCV RBC AUTO: 79.6 FL (ref 79–97)
NOROVIRUS GI+II RNA STL QL NAA+NON-PROBE: NOT DETECTED
P SHIGELLOIDES DNA STL QL NAA+NON-PROBE: NOT DETECTED
PLATELET # BLD AUTO: 214 10*3/MM3 (ref 140–450)
PMV BLD AUTO: 9.8 FL (ref 6–12)
POTASSIUM SERPL-SCNC: 4.6 MMOL/L (ref 3.5–5.2)
RBC # BLD AUTO: 5.6 10*6/MM3 (ref 4.14–5.8)
RVA RNA STL QL NAA+NON-PROBE: NOT DETECTED
S ENT+BONG DNA STL QL NAA+NON-PROBE: NOT DETECTED
SAPO I+II+IV+V RNA STL QL NAA+NON-PROBE: NOT DETECTED
SHIGELLA SP+EIEC IPAH ST NAA+NON-PROBE: NOT DETECTED
SODIUM SERPL-SCNC: 140 MMOL/L (ref 136–145)
V CHOL+PARA+VUL DNA STL QL NAA+NON-PROBE: NOT DETECTED
V CHOLERAE DNA STL QL NAA+NON-PROBE: NOT DETECTED
WBC NRBC COR # BLD AUTO: 12.83 10*3/MM3 (ref 3.4–10.8)
Y ENTEROCOL DNA STL QL NAA+NON-PROBE: NOT DETECTED

## 2024-05-14 PROCEDURE — 87507 IADNA-DNA/RNA PROBE TQ 12-25: CPT | Performed by: STUDENT IN AN ORGANIZED HEALTH CARE EDUCATION/TRAINING PROGRAM

## 2024-05-14 PROCEDURE — 99239 HOSP IP/OBS DSCHRG MGMT >30: CPT | Performed by: STUDENT IN AN ORGANIZED HEALTH CARE EDUCATION/TRAINING PROGRAM

## 2024-05-14 PROCEDURE — 80048 BASIC METABOLIC PNL TOTAL CA: CPT | Performed by: STUDENT IN AN ORGANIZED HEALTH CARE EDUCATION/TRAINING PROGRAM

## 2024-05-14 PROCEDURE — 85027 COMPLETE CBC AUTOMATED: CPT | Performed by: STUDENT IN AN ORGANIZED HEALTH CARE EDUCATION/TRAINING PROGRAM

## 2024-05-14 PROCEDURE — 25810000003 SODIUM CHLORIDE 0.9 % SOLUTION: Performed by: HOSPITALIST

## 2024-05-14 PROCEDURE — 25010000002 ENOXAPARIN PER 10 MG: Performed by: STUDENT IN AN ORGANIZED HEALTH CARE EDUCATION/TRAINING PROGRAM

## 2024-05-14 PROCEDURE — 82948 REAGENT STRIP/BLOOD GLUCOSE: CPT

## 2024-05-14 RX ORDER — DOXYCYCLINE HYCLATE 100 MG/1
100 CAPSULE ORAL 2 TIMES DAILY
Qty: 8 CAPSULE | Refills: 0 | Status: SHIPPED | OUTPATIENT
Start: 2024-05-14 | End: 2024-05-18

## 2024-05-14 RX ORDER — CEFDINIR 300 MG/1
300 CAPSULE ORAL 2 TIMES DAILY
Qty: 8 CAPSULE | Refills: 0 | Status: SHIPPED | OUTPATIENT
Start: 2024-05-14 | End: 2024-05-18

## 2024-05-14 RX ADMIN — SODIUM CHLORIDE 75 ML/HR: 9 INJECTION, SOLUTION INTRAVENOUS at 03:13

## 2024-05-14 RX ADMIN — Medication 1000 UNITS: at 08:16

## 2024-05-14 RX ADMIN — FINASTERIDE 5 MG: 5 TABLET, FILM COATED ORAL at 08:16

## 2024-05-14 RX ADMIN — METRONIDAZOLE 500 MG: 250 TABLET ORAL at 05:42

## 2024-05-14 RX ADMIN — SERTRALINE 100 MG: 50 TABLET, FILM COATED ORAL at 08:16

## 2024-05-14 RX ADMIN — ISOSORBIDE MONONITRATE 60 MG: 30 TABLET, EXTENDED RELEASE ORAL at 08:16

## 2024-05-14 RX ADMIN — EMPAGLIFLOZIN 25 MG: 10 TABLET, FILM COATED ORAL at 08:16

## 2024-05-14 RX ADMIN — ATORVASTATIN CALCIUM 20 MG: 20 TABLET, FILM COATED ORAL at 08:16

## 2024-05-14 RX ADMIN — DOXYCYCLINE 100 MG: 100 INJECTION, POWDER, LYOPHILIZED, FOR SOLUTION INTRAVENOUS at 08:20

## 2024-05-14 RX ADMIN — ENOXAPARIN SODIUM 40 MG: 40 INJECTION SUBCUTANEOUS at 08:17

## 2024-05-14 RX ADMIN — ASPIRIN 81 MG: 81 TABLET, COATED ORAL at 08:16

## 2024-05-14 RX ADMIN — MAGNESIUM GLUCONATE 500 MG ORAL TABLET 400 MG: 500 TABLET ORAL at 08:16

## 2024-05-14 NOTE — PAYOR COMM NOTE
"CONTACT:  SILVESTRE PAYNE RN  UTILIZATION MANAGEMENT DEPT.   [facility]   [address] [facility], [address]   PHONE:  [phone]   FAX: [phone]        IN HOME 5/14/24---AUTH PENDING      REF # [id number]             Denys, Farheen BETSY. \"JU\" (68 y.o. Male)       Date of Birth   1955    Social Security Number       Address   [address] [facility] [facility] [address]    Home Phone   [phone]    MRN   [id number]       [employer]   [place]    Marital Status                               Admission Date   5/12/24    Admission Type   Emergency    Admitting Provider   Kristin Butt DO    Attending Provider       Department, Room/Bed   [facility], 3311/1S       Discharge Date   5/14/2024    Discharge Disposition   Home or Self Care    Discharge Destination                                 Attending Provider: (none)   Allergies: Phenergan [Promethazine]    Isolation: None   Infection: None   Code Status: CPR    Ht: 172.7 cm (68\")   Wt: 107 kg (235 lb 3.7 oz)    Admission Cmt: None   Principal Problem: None                  Active Insurance as of 5/12/2024       Primary Coverage       Payor Plan Insurance Group Employer/Plan Group    [employer] [facility] [employer]        Payor Plan Address Payor Plan Phone Number Payor Plan Fax Number Effective Dates    [address] [phone]  10/5/2020 - None Entered    [facility] [id number]         Subscriber Name Subscriber Birth Date Member ID       DENYSFRAHEEN THAYER 1955 [id number]               Secondary Coverage       Payor Plan Insurance Group Employer/Plan Group    HUMANA MEDICARE REPLACEMENT HUMANA MED ADV SNP HMO [id number]       Payor Plan Address Payor Plan Phone Number Payor Plan Fax Number Effective Dates    [address] [address] [phone]  1/1/2024 - None Entered    [facility] [address]         Subscriber Name Subscriber Birth Date Member ID       FARHEEN MCFARLANE BETSY. 1955 [id number]               "       Emergency Contacts        (Rel.) Home Phone Work Phone Mobile Phone    Argentina Blanco (Spouse) -- -- [phone]    Liobrio Whaley (Son) -- -- [phone]              Discharge Summary    No notes of this type exist for this encounter.       Discharge Order (From admission, onward)       Start     Ordered    05/14/24 1046  Discharge patient  Once        Expected Discharge Date: 05/14/24   Discharge Disposition: Home or Self Care   Physician of Record for Attribution - Please select from Treatment Team: JONATHAN DAWKINS [[id number]]   Review needed by CMO to determine Physician of Record: No      Question Answer Comment   Physician of Record for Attribution - Please select from Treatment Team JONATHAN DAWKINS    Review needed by CMO to determine Physician of Record No        05/14/24 1102

## 2024-05-14 NOTE — PROGRESS NOTES
[facility] HOSPITALIST PROGRESS NOTE     Patient Identification:  Name:  Abraham Blanco  Age:  68 y.o.  Sex:  male  :  1955  MRN:  [id number]  Visit Number:  [id number]  ROOM: [address] [place] [address]     Primary Care Provider:  Estrada Teixeira PA    Length of stay in inpatient status:  1    Subjective     Chief Compliant:    Chief Complaint   Patient presents with    Abdominal Pain       History of Presenting Illness: Patient seen and evaluated in follow-up for sepsis with acute hypoxemic respiratory failure secondary to left lower lobe pneumonia.  Patient time evaluation feeling improved today and oxygen titrated down at bedside with patient able to be titrated to room air by end of day.  Will monitor overnight on home AutoPap and possible discharge in the next 24 to 48 hours pending clinical course.    Objective     Current Hospital Meds:  aspirin, 81 mg, Oral, Daily  atorvastatin, 20 mg, Oral, Daily  cefTRIAXone, 1,000 mg, Intravenous, Q24H  cholecalciferol, 1,000 Units, Oral, Daily  doxycycline, 100 mg, Intravenous, Q12H  empagliflozin, 25 mg, Oral, Daily  enoxaparin, 40 mg, Subcutaneous, Daily  finasteride, 5 mg, Oral, Daily  insulin glargine, 40 Units, Subcutaneous, Nightly  insulin lispro, 2-9 Units, Subcutaneous, 4x Daily AC & at Bedtime  isosorbide mononitrate, 60 mg, Oral, Daily  magnesium oxide, 400 mg, Oral, Daily  metoprolol tartrate, 50 mg, Oral, Nightly  metroNIDAZOLE, 500 mg, Oral, Q8H  [START ON 2024] pantoprazole, 40 mg, Oral, QAM AC  sertraline, 100 mg, Oral, Daily  sodium chloride, 10 mL, Intravenous, Q12H  tamsulosin, 0.4 mg, Oral, Nightly      sodium chloride, 75 mL/hr, Last Rate: 75 mL/hr (24 1411)      ----------------------------------------------------------------------------------------------------------------------  Vital Signs:  Temp:  [97.7 °F (36.5 °C)-98.3 °F (36.8 °C)] 97.7 °F (36.5 °C)  Heart Rate:  [52-76] 55  Resp:  [16-18] 16  BP: (109-169)/(51-69)  "157/69  SpO2:  [94 %-98 %] 94 %  on  Flow (L/min):  [2] 2;   Device (Oxygen Therapy): room air  Body mass index is 35.35 kg/m².      Intake/Output Summary (Last 24 hours) at 5/13/2024 2005  Last data filed at 5/13/2024 1611  Gross per 24 hour   Intake 960 ml   Output 800 ml   Net 160 ml      ----------------------------------------------------------------------------------------------------------------------  Physical exam:  Constitutional: Obese elderly adult male resting in bed in no distress.HENT:  Head:  Normocephalic and atraumatic.  Mouth:  Moist mucous membranes.    Eyes:  Conjunctivae and EOM are normal. No scleral icterus.    Cardiovascular: Bradycardic but regular rhythm and normal heart sounds with no murmur.  Pulmonary/Chest:  No respiratory distress, no wheezes, no crackles, with reduced air movement for lung field.  Improved pleuritic pain with deep inhalation.  Abdominal:  Soft.  Bowel sounds are normal.  No distension and no tenderness.   Musculoskeletal:  No tenderness, and no deformity.  No red or swollen joints anywhere.  Functional ROM intact.   Neurological:  Alert and oriented to person, place, and time.  No cranial nerve deficit.  No tongue deviation.  No facial droop.  No slurred speech. Intact Sensation throughout  Skin:  Skin is warm and dry. No rash or lesion noted. No pallor.   Peripheral vascular:  Pulses in all 4 extremities with no clubbing, no cyanosis, no edema.  Psychiatric: Appropriate mood and affect, pleasant.   ----------------------------------------------------------------------------------------------------------------------  WBC/HGB/HCT/PLT   16.25/13.7/44.6/190 (05/13 0105)  BUN/CREAT/GLUC/ALT/AST/JULIANA/LIP    21/1.48/203/12/15/--/-- (05/13 0104)  LYTES - Na/K/Cl/CO2: 139/4.3/108*/21.7* (05/13 0104)        No results found for: \"URINECX\"  Blood Culture   Date Value Ref Range Status   05/12/2024 No growth at 24 hours  Preliminary   05/12/2024 No growth at 24 hours  Preliminary "       I have personally looked at the labs and they are summarized above.  ----------------------------------------------------------------------------------------------------------------------  Detailed radiology reports for the last 24 hours:  XR Chest 1 View    Result Date: 5/12/2024   Left lower lung infection  This report was finalized on 5/12/2024 6:10 PM by Melody Urias MD.      CT Abdomen Pelvis With Contrast    Result Date: 5/12/2024   Left lower lobe infection versus aspiration.  This report was finalized on 5/12/2024 4:17 PM by Melody Urias MD.     Assessment & Plan      Sepsis  Acute hypoxemic respiratory failure  Left lower lobe bacterial pneumonia, presumed gram-negative    -Patient on room air at baseline and presenting with hypoxia requiring 3 L nasal cannula with recent nausea and vomiting raising concerns for possible aspiration risk but likely more related to sepsis rather than patient not having coming secondary.    -Continue antibiotic therapy with Rocephin and doxycycline    -Patient meeting hypoxemic respiratory failure criteria with a pO2 of 50 at admission    -Sepsis criteria at presentation with heart rate greater than 90 and leukocytosis of 20,000    Acute renal insufficiency    -Patient with elevated creatinine 1.4 but improving with IV fluids given recent GI issues and suspect secondary to prerenal volume depletion    Elevated troponin 2/2 hypoxia  Chronic heart failure with preserved ejection fraction    -Patient chest pain-free with EKG unremarkable and patient without chest pain.    -Suspect hypoxia driven given pO2 of 50 at presentation.    -TTE shows EF of 56 to 60% with grade 2 diastolic dysfunction.    Diabetes mellitus type 2    -Basal and bolus insulin as needed to maintain glycemic control    Copied text in portions of the note has been reviewed and is accurate as of 05/13/24    VTE Prophylaxis:   Mechanical Order History:       None          Pharmalogical Order History:         Ordered     Dose Route Frequency Stop    05/12/24 1958  Enoxaparin Sodium (LOVENOX) syringe 40 mg         40 mg SC Daily --                    Disposition Home    Luis E Rhodes DO  [facility] Hospitalist  05/13/24  20:05 EDT

## 2024-05-15 ENCOUNTER — READMISSION MANAGEMENT (OUTPATIENT)
Dept: CALL CENTER | Facility: HOSPITAL | Age: 69
End: 2024-05-15
Payer: MEDICARE

## 2024-05-15 LAB
QT INTERVAL: 462 MS
QTC INTERVAL: 445 MS

## 2024-05-15 NOTE — DISCHARGE SUMMARY
[facility] DISCHARGE SUMMARY    Patient Identification:  Name:  Abraham Blanco  Age:  68 y.o.  Sex:  male  :  1955  MRN:  [id number]  Visit Number:  [id number]    Date of Admission: 2024  Date of Discharge:  2024     PCP: Estrada Teixeira PA    DISCHARGE DIAGNOSIS  Sepsis  Acute hypoxemic respiratory failure  Left lower lobe bacterial pneumonia, presumed gram-negative  Obstructive sleep apnea  Acute renal insufficiency  Elevated troponin 2/2 hypoxia  Chronic heart failure with preserved ejection fraction  Diabetes mellitus type 2    CONSULTS   Diabetes education    PROCEDURES PERFORMED      HOSPITAL COURSE  Patient is a 68 y.o. male presented to [facility] complaining of nausea, vomiting, and diarrhea with associated abdominal pain..  Please see the admitting history and physical for further details.      After initial evaluation in the emergency department for above-noted complaints patient with no evidence of intra-abdominal process however workup notable for left lower lobe pneumonia with sepsis likely in the setting of recent viral gastroenteritis.  Patient initially on broad-spectrum antibiotic therapy with Rocephin, doxycycline and Flagyl.  Patient with no supplemental oxygen requirements at baseline but requiring 2 L during hospitalization and meeting acute hypoxemic respiratory failure criteria with a pO2 of 50.2 on ABG obtained on room air at time of his presentation.  Post admission patient progressing very well and able to be weaned off supplemental oxygen pretty quickly within roughly 36 hours of admission.  Prior to day of discharge patient actually able to be titrated down to room air that evening and instructed on the importance of continuing to be monitored on room air with use of home AutoPap to ensure no further respiratory difficulties before discharge home.  Patient did well overnight although unfortunately spouse forgetting to bring  power cord for AutoPap but required only 2 L nasal cannula of supplemental oxygen at night consistent with his known history of obstructive sleep apnea.  Patient without any signs or symptoms of aspiration and Flagyl discontinued while in hospital and patient kept on Rocephin and doxycycline therapy alone.  Creatinine was fairly stable trend here in hospital with initial presenting creatinine of 1.4 trending up to 1.52 by time of discharge but with slowing trend consistent with plateauing and then expected recovery as patient with recent intractable GI losses and GI illness likely contributing.  To that end patient's home losartan was held while in hospital with good tolerance and blood pressures only rising as high as 130 systolic.  To this and patient's losartan was held at time of his discharge and instructed not to resume unless significantly elevated blood pressures at home or until follow-up with primary care and repeat BMP for follow-up on renal function.  Patient at presentation initially with leukocytosis at 20,000 improved to 12.8 on day of discharge and given continued downward trend and return to baseline oxygen requirements and patient feeling significantly improved and inquiring about returning home he was felt to have achieved maximal benefit of continued inpatient hospitalization as patient was improving on antibiotic therapy with oral equivalents which she was transition to at time of discharge with Omnicef and doxycycline.  Patient will follow-up with primary care provider within approximately 1 week's time where he will have repeat BMP to follow-up on renal function and ensure continued improvement post discharge.  Patient also instructed given his desire to lose weight and have better glucose control for upcoming shoulder surgery to carefully monitor morning glucose checks and to reduce long-acting insulin by 5 units at a time per instruction if noting developing low glucose in the a.m.  Patient  subsequently discharged home in stable medical condition and in good spirits.    VITAL SIGNS:  Temp:  [97.7 °F (36.5 °C)-98.5 °F (36.9 °C)] 98.2 °F (36.8 °C)  Heart Rate:  [53-73] 73  Resp:  [16-20] 18  BP: (123-139)/(52-67) 130/61  SpO2:  [93 %-94 %] 94 %  on   ;   Device (Oxygen Therapy): room air    Body mass index is 35.77 kg/m².  Wt Readings from Last 3 Encounters:   05/14/24 107 kg (235 lb 3.7 oz)   04/25/24 107 kg (235 lb 14.3 oz)   04/17/24 107 kg (235 lb)       PHYSICAL EXAM:  Constitutional: Obese elderly adult male resting in bed in no distress.HENT:  Head:  Normocephalic and atraumatic.  Mouth:  Moist mucous membranes.    Eyes:  Conjunctivae and EOM are normal. No scleral icterus.    Cardiovascular: Bradycardic but regular rhythm and normal heart sounds with no murmur.  Pulmonary/Chest:  No respiratory distress, no wheezes, no crackles, with reduced air movement left lung field.  Resolved pleuritic pain with deep inhalation.  Abdominal:  Soft.  Bowel sounds are normal.  No distension and no tenderness.   Musculoskeletal:  No tenderness, and no deformity.  No red or swollen joints anywhere.  Functional ROM intact.   Neurological:  Alert and oriented to person, place, and time.  No cranial nerve deficit.  No tongue deviation.  No facial droop.  No slurred speech. Intact Sensation throughout  Skin:  Skin is warm and dry. No rash or lesion noted. No pallor.   Peripheral vascular:  Pulses in all 4 extremities with no clubbing, no cyanosis, no edema.  Psychiatric: Appropriate mood and affect, pleasant.     DISCHARGE DISPOSITION   Stable    DISCHARGE MEDICATIONS:     Discharge Medications        New Medications        Instructions Start Date   cefdinir 300 MG capsule  Commonly known as: OMNICEF   300 mg, Oral, 2 Times Daily      doxycycline 100 MG capsule  Commonly known as: VIBRAMYCIN   100 mg, Oral, 2 Times Daily             Continue These Medications        Instructions Start Date   aspirin 81 MG EC tablet    81 mg, Oral, Daily      atorvastatin 20 MG tablet  Commonly known as: LIPITOR   20 mg, Oral, Daily      baclofen 10 MG tablet  Commonly known as: LIORESAL   10 mg, Oral, 3 Times Daily PRN      cholecalciferol 25 MCG (1000 UT) tablet  Commonly known as: VITAMIN D3   1,000 Units, Oral, Daily      Diclofenac Sodium 1 % gel gel  Commonly known as: VOLTAREN   4 g, Topical, 4 Times Daily PRN      finasteride 5 MG tablet  Commonly known as: PROSCAR   5 mg, Oral, Daily      fish oil 1000 MG capsule capsule   1,000 mg, Oral, Daily With Breakfast      furosemide 10 MG half tablet  Commonly known as: LASIX   10 mg, Oral, Daily PRN      gabapentin 300 MG capsule  Commonly known as: NEURONTIN   300 mg, Oral, 3 Times Daily PRN      insulin glargine 100 UNIT/ML injection  Commonly known as: LANTUS, SEMGLEE   80 Units, Subcutaneous, Nightly      insulin regular 100 UNIT/ML injection  Commonly known as: humuLIN R,novoLIN R   0-10 Units, Subcutaneous, 3 Times Daily Before Meals      isosorbide mononitrate 60 MG 24 hr tablet  Commonly known as: IMDUR   60 mg, Oral, Daily      Jardiance 25 MG tablet tablet  Generic drug: empagliflozin   25 mg, Oral, Daily      lidocaine 5 % ointment  Commonly known as: XYLOCAINE   1 Application, Topical, Every 2 Hours PRN      lidocaine 5 %  Commonly known as: LIDODERM   1 patch, Transdermal, Daily PRN, Remove & Discard patch within 12 hours or as directed by MD      Magnesium Oxide 420 MG tablet   1 tablet, Oral, Daily      metoprolol tartrate 50 MG tablet  Commonly known as: LOPRESSOR   50 mg, Oral, Nightly      pantoprazole 40 MG EC tablet  Commonly known as: PROTONIX   40 mg, Oral, Daily      potassium chloride 10 MEQ CR tablet   10 mEq, Oral, Daily PRN      sertraline 100 MG tablet  Commonly known as: ZOLOFT   100 mg, Oral, Daily      tamsulosin 0.4 MG capsule 24 hr capsule  Commonly known as: FLOMAX   1 capsule, Oral, Nightly             Stop These Medications      losartan 100 MG  tablet  Commonly known as: COZAAR              Diet Instructions    Continue Cardiac/Diabetic diet as tolerated          Activity Instructions    Advance activity as tolerated          Additional Instructions for the Follow-ups that You Need to Schedule       Discharge Follow-up with PCP   As directed       Currently Documented PCP:    Estrada Teixeira PA    PCP Phone Number:    [phone]     Follow Up Details: 1 week post hospital follow up               Follow-up Information       Estrada Teixeira PA. Go on 5/20/2024.    Specialty: Physician Assistant  Why: @ 9:40am  Contact information:  [address] [place] [address]  [phone]               Estrada Teixeira PA .    Specialty: Physician Assistant  Contact information:  [address] [place] [address]  [phone]                              TEST  RESULTS PENDING AT DISCHARGE  Pending Labs       Order Current Status    Blood Culture - Blood, Arm, Left Preliminary result    Blood Culture - Blood, Arm, Right Preliminary result             CODE STATUS  Code Status and Medical Interventions:   Ordered at: 05/13/24 0229     Code Status (Patient has no pulse and is not breathing):    CPR (Attempt to Resuscitate)     Medical Interventions (Patient has pulse or is breathing):    Full Support       Luis E Rhodes DO  [facility]  05/14/24  22:37 EDT    Please note that this discharge summary required more than 30 minutes to complete.

## 2024-05-15 NOTE — OUTREACH NOTE
Prep Survey      Flowsheet Row Responses   [facility] facility patient discharged from? [facility]   Is LACE score < 7 ? No   Eligibility Readm Mgmt   Discharge diagnosis Sepsis  Acute hypoxemic respiratory failure  Left lower lobe bacterial pneumonia   Does the patient have one of the following disease processes/diagnoses(primary or secondary)? Sepsis   Does the patient have Home health ordered? No   Is there a DME ordered? No   Prep survey completed? Yes            Lady SHOEMAKER - Registered Nurse

## 2024-05-17 LAB
BACTERIA SPEC AEROBE CULT: NORMAL
BACTERIA SPEC AEROBE CULT: NORMAL

## 2024-05-21 ENCOUNTER — READMISSION MANAGEMENT (OUTPATIENT)
Dept: CALL CENTER | Facility: HOSPITAL | Age: 69
End: 2024-05-21
Payer: MEDICARE

## 2024-05-21 NOTE — OUTREACH NOTE
Prep Survey      Flowsheet Row Responses   [facility] facility patient discharged from? [facility]   Discharge diagnosis Sepsis  Acute hypoxemic respiratory failure  Left lower lobe bacterial pneumonia   Does the patient have one of the following disease processes/diagnoses(primary or secondary)? Sepsis            MARGY FARIA - Registered Nurse

## 2024-05-28 ENCOUNTER — TELEPHONE (OUTPATIENT)
Dept: ORTHOPEDIC SURGERY | Facility: CLINIC | Age: 69
End: 2024-05-28
Payer: MEDICARE

## 2024-05-28 NOTE — TELEPHONE ENCOUNTER
I called this patient to see how he was doing after being in the hospital. He said he's been doing well. I advised that we want a primary doctor clearance to say that he is okay from his pneumonia. He is getting pre op labs next week. He verbalized understanding.

## 2024-06-05 ENCOUNTER — TRANSITIONAL CARE MANAGEMENT TELEPHONE ENCOUNTER (OUTPATIENT)
Dept: ORTHOPEDIC SURGERY | Facility: CLINIC | Age: 69
End: 2024-06-05
Payer: MEDICARE

## 2024-06-05 ENCOUNTER — LAB (OUTPATIENT)
Dept: LAB | Facility: HOSPITAL | Age: 69
End: 2024-06-05
Payer: MEDICARE

## 2024-06-05 DIAGNOSIS — Z98.890 S/P LEFT ROTATOR CUFF REPAIR: ICD-10-CM

## 2024-06-05 DIAGNOSIS — M75.122 NONTRAUMATIC COMPLETE TEAR OF LEFT ROTATOR CUFF: ICD-10-CM

## 2024-06-05 DIAGNOSIS — Z98.890 STATUS POST LEFT ROTATOR CUFF REPAIR: ICD-10-CM

## 2024-06-05 LAB
ALBUMIN SERPL-MCNC: 4.2 G/DL (ref 3.5–5.2)
ALBUMIN/GLOB SERPL: 1.3 G/DL
ALP SERPL-CCNC: 105 U/L (ref 39–117)
ALT SERPL W P-5'-P-CCNC: 24 U/L (ref 1–41)
ANION GAP SERPL CALCULATED.3IONS-SCNC: 9.9 MMOL/L (ref 5–15)
APTT PPP: 26.7 SECONDS (ref 26.5–34.5)
AST SERPL-CCNC: 20 U/L (ref 1–40)
BASOPHILS # BLD AUTO: 0.1 10*3/MM3 (ref 0–0.2)
BASOPHILS NFR BLD AUTO: 0.9 % (ref 0–1.5)
BILIRUB SERPL-MCNC: 0.7 MG/DL (ref 0–1.2)
BUN SERPL-MCNC: 28 MG/DL (ref 8–23)
BUN/CREAT SERPL: 22 (ref 7–25)
CALCIUM SPEC-SCNC: 9.6 MG/DL (ref 8.6–10.5)
CHLORIDE SERPL-SCNC: 103 MMOL/L (ref 98–107)
CO2 SERPL-SCNC: 26.1 MMOL/L (ref 22–29)
CREAT SERPL-MCNC: 1.27 MG/DL (ref 0.76–1.27)
DEPRECATED RDW RBC AUTO: 46.8 FL (ref 37–54)
EGFRCR SERPLBLD CKD-EPI 2021: 60.8 ML/MIN/1.73
EOSINOPHIL # BLD AUTO: 0.34 10*3/MM3 (ref 0–0.4)
EOSINOPHIL NFR BLD AUTO: 3 % (ref 0.3–6.2)
ERYTHROCYTE [DISTWIDTH] IN BLOOD BY AUTOMATED COUNT: 18.1 % (ref 12.3–15.4)
GLOBULIN UR ELPH-MCNC: 3.2 GM/DL
GLUCOSE SERPL-MCNC: 189 MG/DL (ref 65–99)
HBA1C MFR BLD: 8.3 % (ref 4.8–5.6)
HCT VFR BLD AUTO: 56.2 % (ref 37.5–51)
HGB BLD-MCNC: 17.1 G/DL (ref 13–17.7)
IMM GRANULOCYTES # BLD AUTO: 0.08 10*3/MM3 (ref 0–0.05)
IMM GRANULOCYTES NFR BLD AUTO: 0.7 % (ref 0–0.5)
INR PPP: 0.94 (ref 0.9–1.1)
LYMPHOCYTES # BLD AUTO: 2.62 10*3/MM3 (ref 0.7–3.1)
LYMPHOCYTES NFR BLD AUTO: 22.9 % (ref 19.6–45.3)
MCH RBC QN AUTO: 24.5 PG (ref 26.6–33)
MCHC RBC AUTO-ENTMCNC: 30.4 G/DL (ref 31.5–35.7)
MCV RBC AUTO: 80.5 FL (ref 79–97)
MONOCYTES # BLD AUTO: 0.97 10*3/MM3 (ref 0.1–0.9)
MONOCYTES NFR BLD AUTO: 8.5 % (ref 5–12)
NEUTROPHILS NFR BLD AUTO: 64 % (ref 42.7–76)
NEUTROPHILS NFR BLD AUTO: 7.33 10*3/MM3 (ref 1.7–7)
NRBC BLD AUTO-RTO: 0 /100 WBC (ref 0–0.2)
PLATELET # BLD AUTO: 252 10*3/MM3 (ref 140–450)
PMV BLD AUTO: 9.5 FL (ref 6–12)
POTASSIUM SERPL-SCNC: 5.3 MMOL/L (ref 3.5–5.2)
PROT SERPL-MCNC: 7.4 G/DL (ref 6–8.5)
PROTHROMBIN TIME: 12.7 SECONDS (ref 12.1–14.7)
RBC # BLD AUTO: 6.98 10*6/MM3 (ref 4.14–5.8)
SODIUM SERPL-SCNC: 139 MMOL/L (ref 136–145)
WBC NRBC COR # BLD AUTO: 11.44 10*3/MM3 (ref 3.4–10.8)

## 2024-06-05 PROCEDURE — 36415 COLL VENOUS BLD VENIPUNCTURE: CPT

## 2024-06-05 PROCEDURE — 85610 PROTHROMBIN TIME: CPT

## 2024-06-05 PROCEDURE — 85730 THROMBOPLASTIN TIME PARTIAL: CPT

## 2024-06-05 PROCEDURE — 85025 COMPLETE CBC W/AUTO DIFF WBC: CPT

## 2024-06-05 PROCEDURE — 83036 HEMOGLOBIN GLYCOSYLATED A1C: CPT

## 2024-06-05 PROCEDURE — 80053 COMPREHEN METABOLIC PANEL: CPT

## 2024-06-05 NOTE — OUTREACH NOTE
Pre-Operative Orthopedic Assessment      Patient: Abraham Blanco    YOB: 1955      Age/Gender: 68 y.o. male  Medical Record Number: [id number]  Surgical Procedure Planned:   LT RSA  Surgeon: DR. MINH MEMBRENO  Date of Surgery Planned: 06/19/24        Discharge Disposition: []OVERNIGHGT STAY   [x]HOME SAME DAY    Sleeping Arrangements: []RECLINER   []BED WEDGE   [x]BED W/ ELEVATED HEAD                     Benzoyl Peroxide Gel: [x] PICKED UP (Can be picked up OTC)    Surgery : WIFE - LEONOR    Caretaker post-op: WIFE - LEONOR    Medical Equipment: [x] SLING     Clearance Needed: [x]CARDIAC   []PULMONOLOGY   []MEDICAL   []RHEUMATOLOGY      Type of Residence: [x]PRIVATE RESIDENCE   [] ASSISTED LIVING   []NURSING HOME   []GROUP HOME   []HOMELESS     Living Arrangements: []ALONE   []CHILDREN   []FAMILY MEMBER   []FRIENDS    []PARENT   []SIGNIFICANT OTHER   [x]SPOUSE    Transportation Needs: In the past 12 months has lack of transportation kept you from medical appointments, or from getting medication? []YES   [x]NO    Patient Education    Move commonly used items to counter height; (foods, utensils, pots, pans, appliances)  Practice doing things with one arm (Especially if surgery is on your dominant arm)  Find loose fitting clothes or button up tops  Stock up on pre-made meals, frozen meals, and toiletry items   Long-handled back washer

## 2024-06-11 ENCOUNTER — TELEPHONE (OUTPATIENT)
Dept: ORTHOPEDIC SURGERY | Facility: CLINIC | Age: 69
End: 2024-06-11
Payer: MEDICARE

## 2024-06-11 NOTE — TELEPHONE ENCOUNTER
Spoke with patient regarding his clearance for surgery Tuesday 06.18.2024. Patient is requesting a call back as soon as possible. Please advise.

## 2024-06-12 NOTE — TELEPHONE ENCOUNTER
I spoke with patient and let him know that we have not received his clearance from his PCP for his surgery with Dr. Vega on 6/17. The patient stated that he faxed a clearance to our office on Monday, He will send it again today. I let the patient know that I would be looking for it and when received I will get it scanned into his chart and he will be good to go for surgery. Patient expressed understanding. He has no other questions at this time. Yen Aguirre CMA

## 2024-06-18 ENCOUNTER — ANESTHESIA EVENT (OUTPATIENT)
Dept: PERIOP | Facility: HOSPITAL | Age: 69
End: 2024-06-18
Payer: MEDICARE

## 2024-06-18 ENCOUNTER — HOSPITAL ENCOUNTER (OUTPATIENT)
Dept: CT IMAGING | Facility: HOSPITAL | Age: 69
Discharge: HOME | End: 2024-06-18
Admitting: ORTHOPAEDIC SURGERY
Payer: MEDICARE

## 2024-06-18 DIAGNOSIS — M75.122 NONTRAUMATIC COMPLETE TEAR OF LEFT ROTATOR CUFF: ICD-10-CM

## 2024-06-18 DIAGNOSIS — Z98.890 STATUS POST LEFT ROTATOR CUFF REPAIR: ICD-10-CM

## 2024-06-18 DIAGNOSIS — Z98.890 S/P LEFT ROTATOR CUFF REPAIR: ICD-10-CM

## 2024-06-18 PROCEDURE — 73200 CT UPPER EXTREMITY W/O DYE: CPT

## 2024-06-18 RX ORDER — FAMOTIDINE 10 MG/ML
20 INJECTION, SOLUTION INTRAVENOUS ONCE
Status: CANCELLED | OUTPATIENT
Start: 2024-06-18 | End: 2024-06-18

## 2024-06-18 RX ORDER — DULAGLUTIDE 0.75 MG/.5ML
0.75 INJECTION, SOLUTION SUBCUTANEOUS WEEKLY
COMMUNITY
Start: 2024-06-10

## 2024-06-18 RX ORDER — LOSARTAN POTASSIUM 100 MG/1
100 TABLET ORAL DAILY
COMMUNITY
Start: 2024-06-03

## 2024-06-18 NOTE — ANESTHESIA PREPROCEDURE EVALUATION
Anesthesia Evaluation     Patient summary reviewed and Nursing notes reviewed   NPO Solid Status: > 8 hours  NPO Liquid Status: > 2 hours           Airway   Mallampati: IV  TM distance: >3 FB  Neck ROM: full  No difficulty expected  Dental    (+) upper dentures and lower dentures    Pulmonary     breath sounds clear to auscultation  (+) ,sleep apnea  Cardiovascular     ECG reviewed  Rhythm: regular  Rate: normal    (+) hypertension, CAD, CABG >6 Months, cardiac stents , hyperlipidemia      Neuro/Psych  GI/Hepatic/Renal/Endo    (+) obesity, GERD, diabetes mellitus    Musculoskeletal     Abdominal    Substance History      OB/GYN          Other   arthritis,   history of cancer    ROS/Med Hx Other: EF 55%; Moderate pulm HTN                Anesthesia Plan    ASA 3     general with block     (Consider VL for ETT)  intravenous induction     Anesthetic plan, risks, benefits, and alternatives have been provided, discussed and informed consent has been obtained with: patient.    Plan discussed with CRNA.    CODE STATUS:

## 2024-06-19 ENCOUNTER — ANESTHESIA EVENT CONVERTED (OUTPATIENT)
Dept: ANESTHESIOLOGY | Facility: HOSPITAL | Age: 69
End: 2024-06-19
Payer: MEDICARE

## 2024-06-19 ENCOUNTER — ANESTHESIA (OUTPATIENT)
Dept: PERIOP | Facility: HOSPITAL | Age: 69
End: 2024-06-19
Payer: MEDICARE

## 2024-06-19 ENCOUNTER — HOSPITAL ENCOUNTER (OUTPATIENT)
Facility: HOSPITAL | Age: 69
Discharge: HOME | End: 2024-06-19
Attending: ORTHOPAEDIC SURGERY | Admitting: ORTHOPAEDIC SURGERY
Payer: MEDICARE

## 2024-06-19 ENCOUNTER — APPOINTMENT (OUTPATIENT)
Dept: GENERAL RADIOLOGY | Facility: HOSPITAL | Age: 69
End: 2024-06-19
Payer: MEDICARE

## 2024-06-19 VITALS
RESPIRATION RATE: 18 BRPM | DIASTOLIC BLOOD PRESSURE: 61 MMHG | TEMPERATURE: 97.5 F | WEIGHT: 235.89 LBS | SYSTOLIC BLOOD PRESSURE: 130 MMHG | BODY MASS INDEX: 35.75 KG/M2 | HEART RATE: 45 BPM | HEIGHT: 68 IN | OXYGEN SATURATION: 92 %

## 2024-06-19 DIAGNOSIS — Z98.890 STATUS POST LEFT ROTATOR CUFF REPAIR: ICD-10-CM

## 2024-06-19 DIAGNOSIS — M19.012 ARTHRITIS OF SHOULDER REGION, LEFT: Primary | ICD-10-CM

## 2024-06-19 DIAGNOSIS — Z98.890 S/P LEFT ROTATOR CUFF REPAIR: ICD-10-CM

## 2024-06-19 DIAGNOSIS — M75.122 NONTRAUMATIC COMPLETE TEAR OF LEFT ROTATOR CUFF: ICD-10-CM

## 2024-06-19 LAB
ABO GROUP BLD: NORMAL
ABO GROUP BLD: NORMAL
BLD GP AB SCN SERPL QL: NEGATIVE
GLUCOSE BLDC GLUCOMTR-MCNC: 118 MG/DL (ref 70–130)
GLUCOSE BLDC GLUCOMTR-MCNC: 144 MG/DL (ref 70–130)
POTASSIUM SERPL-SCNC: 4.5 MMOL/L (ref 3.5–5.2)
RH BLD: POSITIVE
RH BLD: POSITIVE
T&S EXPIRATION DATE: NORMAL

## 2024-06-19 PROCEDURE — 25010000002 BUPIVACAINE (PF) 0.25 % SOLUTION: Performed by: NURSE ANESTHETIST, CERTIFIED REGISTERED

## 2024-06-19 PROCEDURE — 25010000002 DEXAMETHASONE SODIUM PHOSPHATE 10 MG/ML SOLUTION: Performed by: NURSE ANESTHETIST, CERTIFIED REGISTERED

## 2024-06-19 PROCEDURE — 25010000002 FENTANYL CITRATE (PF) 50 MCG/ML SOLUTION: Performed by: NURSE ANESTHETIST, CERTIFIED REGISTERED

## 2024-06-19 PROCEDURE — C1713 ANCHOR/SCREW BN/BN,TIS/BN: HCPCS | Performed by: ORTHOPAEDIC SURGERY

## 2024-06-19 PROCEDURE — C1776 JOINT DEVICE (IMPLANTABLE): HCPCS | Performed by: ORTHOPAEDIC SURGERY

## 2024-06-19 PROCEDURE — 25010000002 GLYCOPYRROLATE 1 MG/5ML SOLUTION: Performed by: ANESTHESIOLOGY

## 2024-06-19 PROCEDURE — 82948 REAGENT STRIP/BLOOD GLUCOSE: CPT

## 2024-06-19 PROCEDURE — 86850 RBC ANTIBODY SCREEN: CPT | Performed by: ORTHOPAEDIC SURGERY

## 2024-06-19 PROCEDURE — 25010000002 VANCOMYCIN HCL IN NACL 1.5-0.9 GM/500ML-% SOLUTION: Performed by: ORTHOPAEDIC SURGERY

## 2024-06-19 PROCEDURE — 97530 THERAPEUTIC ACTIVITIES: CPT | Performed by: OCCUPATIONAL THERAPIST

## 2024-06-19 PROCEDURE — 25810000003 LACTATED RINGERS PER 1000 ML: Performed by: ANESTHESIOLOGY

## 2024-06-19 PROCEDURE — 73020 X-RAY EXAM OF SHOULDER: CPT

## 2024-06-19 PROCEDURE — 84132 ASSAY OF SERUM POTASSIUM: CPT | Performed by: ANESTHESIOLOGY

## 2024-06-19 PROCEDURE — S0260 H&P FOR SURGERY: HCPCS | Performed by: ORTHOPAEDIC SURGERY

## 2024-06-19 PROCEDURE — 25010000002 DEXAMETHASONE PER 1 MG: Performed by: ANESTHESIOLOGY

## 2024-06-19 PROCEDURE — 25010000002 SUGAMMADEX 200 MG/2ML SOLUTION: Performed by: ANESTHESIOLOGY

## 2024-06-19 PROCEDURE — 25010000002 CEFAZOLIN PER 500 MG: Performed by: ORTHOPAEDIC SURGERY

## 2024-06-19 PROCEDURE — 25010000002 ONDANSETRON PER 1 MG: Performed by: ANESTHESIOLOGY

## 2024-06-19 PROCEDURE — 97165 OT EVAL LOW COMPLEX 30 MIN: CPT | Performed by: OCCUPATIONAL THERAPIST

## 2024-06-19 PROCEDURE — 86901 BLOOD TYPING SEROLOGIC RH(D): CPT

## 2024-06-19 PROCEDURE — 86901 BLOOD TYPING SEROLOGIC RH(D): CPT | Performed by: ORTHOPAEDIC SURGERY

## 2024-06-19 PROCEDURE — 25010000002 ROPIVACAINE HCL-NACL 0.2-0.9 % SOLUTION

## 2024-06-19 PROCEDURE — 25010000002 HYDROMORPHONE 1 MG/ML SOLUTION

## 2024-06-19 PROCEDURE — 23472 RECONSTRUCT SHOULDER JOINT: CPT | Performed by: ORTHOPAEDIC SURGERY

## 2024-06-19 PROCEDURE — 97535 SELF CARE MNGMENT TRAINING: CPT | Performed by: OCCUPATIONAL THERAPIST

## 2024-06-19 PROCEDURE — 25010000002 DROPERIDOL PER 5 MG

## 2024-06-19 PROCEDURE — 97110 THERAPEUTIC EXERCISES: CPT | Performed by: OCCUPATIONAL THERAPIST

## 2024-06-19 PROCEDURE — 23472 RECONSTRUCT SHOULDER JOINT: CPT

## 2024-06-19 PROCEDURE — 86900 BLOOD TYPING SEROLOGIC ABO: CPT | Performed by: ORTHOPAEDIC SURGERY

## 2024-06-19 PROCEDURE — 25010000002 PROPOFOL 10 MG/ML EMULSION: Performed by: ANESTHESIOLOGY

## 2024-06-19 PROCEDURE — 25010000002 FENTANYL CITRATE (PF) 50 MCG/ML SOLUTION

## 2024-06-19 PROCEDURE — 25010000002 HYDROMORPHONE PER 4 MG: Performed by: NURSE ANESTHETIST, CERTIFIED REGISTERED

## 2024-06-19 PROCEDURE — 86900 BLOOD TYPING SEROLOGIC ABO: CPT

## 2024-06-19 DEVICE — GLENOSPHERE SHLDR AEQUALIS PERFORM STD 39MM: Type: IMPLANTABLE DEVICE | Site: SHOULDER | Status: FUNCTIONAL

## 2024-06-19 DEVICE — IMPLANTABLE DEVICE: Type: IMPLANTABLE DEVICE | Site: SHOULDER | Status: FUNCTIONAL

## 2024-06-19 DEVICE — SCRW AEQUALIS PERFORM PERIPH 5X26MM: Type: IMPLANTABLE DEVICE | Site: SHOULDER | Status: FUNCTIONAL

## 2024-06-19 DEVICE — BASEPLT SHLDR AEQUALIS FUL/WEDGE AUG 15D 25MM: Type: IMPLANTABLE DEVICE | Site: SHOULDER | Status: FUNCTIONAL

## 2024-06-19 DEVICE — SCRW AEQUALIS PERFORM PERIPH 5X38MM: Type: IMPLANTABLE DEVICE | Site: SHOULDER | Status: FUNCTIONAL

## 2024-06-19 DEVICE — SCRW AEQUALIS PERFORM PERIPH 5X30MM: Type: IMPLANTABLE DEVICE | Site: SHOULDER | Status: FUNCTIONAL

## 2024-06-19 DEVICE — POST SHDLR AEQUALIS PERFORM REV PRSS/FIT SHT 7MM: Type: IMPLANTABLE DEVICE | Site: SHOULDER | Status: FUNCTIONAL

## 2024-06-19 RX ORDER — SODIUM CHLORIDE, SODIUM LACTATE, POTASSIUM CHLORIDE, CALCIUM CHLORIDE 600; 310; 30; 20 MG/100ML; MG/100ML; MG/100ML; MG/100ML
INJECTION, SOLUTION INTRAVENOUS CONTINUOUS PRN
Status: DISCONTINUED | OUTPATIENT
Start: 2024-06-19 | End: 2024-06-19 | Stop reason: SURG

## 2024-06-19 RX ORDER — ROCURONIUM BROMIDE 10 MG/ML
INJECTION, SOLUTION INTRAVENOUS AS NEEDED
Status: DISCONTINUED | OUTPATIENT
Start: 2024-06-19 | End: 2024-06-19 | Stop reason: SURG

## 2024-06-19 RX ORDER — FENTANYL CITRATE 50 UG/ML
INJECTION, SOLUTION INTRAMUSCULAR; INTRAVENOUS
Status: DISCONTINUED
Start: 2024-06-19 | End: 2024-06-19 | Stop reason: HOSPADM

## 2024-06-19 RX ORDER — HYDROMORPHONE HYDROCHLORIDE 1 MG/ML
0.5 INJECTION, SOLUTION INTRAMUSCULAR; INTRAVENOUS; SUBCUTANEOUS
Status: DISCONTINUED | OUTPATIENT
Start: 2024-06-19 | End: 2024-06-19 | Stop reason: HOSPADM

## 2024-06-19 RX ORDER — PREGABALIN 75 MG/1
75 CAPSULE ORAL ONCE
Status: COMPLETED | OUTPATIENT
Start: 2024-06-19 | End: 2024-06-19

## 2024-06-19 RX ORDER — DROPERIDOL 2.5 MG/ML
INJECTION, SOLUTION INTRAMUSCULAR; INTRAVENOUS
Status: COMPLETED
Start: 2024-06-19 | End: 2024-06-19

## 2024-06-19 RX ORDER — ACETAMINOPHEN 325 MG/1
650 TABLET ORAL EVERY 6 HOURS
Status: CANCELLED | OUTPATIENT
Start: 2024-06-19

## 2024-06-19 RX ORDER — DEXAMETHASONE SODIUM PHOSPHATE 4 MG/ML
INJECTION, SOLUTION INTRA-ARTICULAR; INTRALESIONAL; INTRAMUSCULAR; INTRAVENOUS; SOFT TISSUE AS NEEDED
Status: DISCONTINUED | OUTPATIENT
Start: 2024-06-19 | End: 2024-06-19 | Stop reason: SURG

## 2024-06-19 RX ORDER — OXYCODONE AND ACETAMINOPHEN 10; 325 MG/1; MG/1
1 TABLET ORAL EVERY 6 HOURS PRN
Qty: 28 TABLET | Refills: 0 | Status: SHIPPED | OUTPATIENT
Start: 2024-06-19 | End: 2024-06-26

## 2024-06-19 RX ORDER — VANCOMYCIN/0.9 % SOD CHLORIDE 1.5G/250ML
15 PLASTIC BAG, INJECTION (ML) INTRAVENOUS ONCE
Status: COMPLETED | OUTPATIENT
Start: 2024-06-19 | End: 2024-06-19

## 2024-06-19 RX ORDER — HYDRALAZINE HYDROCHLORIDE 20 MG/ML
5 INJECTION INTRAMUSCULAR; INTRAVENOUS
Status: DISCONTINUED | OUTPATIENT
Start: 2024-06-19 | End: 2024-06-19 | Stop reason: HOSPADM

## 2024-06-19 RX ORDER — SCOLOPAMINE TRANSDERMAL SYSTEM 1 MG/1
1 PATCH, EXTENDED RELEASE TRANSDERMAL CONTINUOUS
Status: DISCONTINUED | OUTPATIENT
Start: 2024-06-19 | End: 2024-06-19

## 2024-06-19 RX ORDER — SODIUM CHLORIDE 9 MG/ML
40 INJECTION, SOLUTION INTRAVENOUS AS NEEDED
Status: DISCONTINUED | OUTPATIENT
Start: 2024-06-19 | End: 2024-06-19 | Stop reason: HOSPADM

## 2024-06-19 RX ORDER — LIDOCAINE HYDROCHLORIDE 10 MG/ML
INJECTION, SOLUTION EPIDURAL; INFILTRATION; INTRACAUDAL; PERINEURAL AS NEEDED
Status: DISCONTINUED | OUTPATIENT
Start: 2024-06-19 | End: 2024-06-19 | Stop reason: SURG

## 2024-06-19 RX ORDER — SODIUM CHLORIDE 0.9 % (FLUSH) 0.9 %
3 SYRINGE (ML) INJECTION EVERY 12 HOURS SCHEDULED
Status: DISCONTINUED | OUTPATIENT
Start: 2024-06-19 | End: 2024-06-19 | Stop reason: HOSPADM

## 2024-06-19 RX ORDER — DROPERIDOL 2.5 MG/ML
0.62 INJECTION, SOLUTION INTRAMUSCULAR; INTRAVENOUS ONCE AS NEEDED
Status: DISCONTINUED | OUTPATIENT
Start: 2024-06-19 | End: 2024-06-19 | Stop reason: HOSPADM

## 2024-06-19 RX ORDER — BUPIVACAINE HYDROCHLORIDE 2.5 MG/ML
INJECTION, SOLUTION EPIDURAL; INFILTRATION; INTRACAUDAL
Status: COMPLETED | OUTPATIENT
Start: 2024-06-19 | End: 2024-06-19

## 2024-06-19 RX ORDER — ROPIVACAINE HYDROCHLORIDE 2 MG/ML
INJECTION, SOLUTION EPIDURAL; INFILTRATION; PERINEURAL CONTINUOUS
Status: DISCONTINUED | OUTPATIENT
Start: 2024-06-19 | End: 2024-06-19 | Stop reason: HOSPADM

## 2024-06-19 RX ORDER — AMOXICILLIN 250 MG
2 CAPSULE ORAL 2 TIMES DAILY PRN
Status: CANCELLED | OUTPATIENT
Start: 2024-06-19 | End: 2024-06-26

## 2024-06-19 RX ORDER — SODIUM CHLORIDE 0.9 % (FLUSH) 0.9 %
3-10 SYRINGE (ML) INJECTION AS NEEDED
Status: DISCONTINUED | OUTPATIENT
Start: 2024-06-19 | End: 2024-06-19 | Stop reason: HOSPADM

## 2024-06-19 RX ORDER — SODIUM CHLORIDE 9 MG/ML
75 INJECTION, SOLUTION INTRAVENOUS CONTINUOUS
Status: CANCELLED | OUTPATIENT
Start: 2024-06-19 | End: 2024-06-26

## 2024-06-19 RX ORDER — SODIUM CHLORIDE 0.9 % (FLUSH) 0.9 %
10 SYRINGE (ML) INJECTION AS NEEDED
Status: DISCONTINUED | OUTPATIENT
Start: 2024-06-19 | End: 2024-06-19 | Stop reason: HOSPADM

## 2024-06-19 RX ORDER — DIPHENHYDRAMINE HYDROCHLORIDE 50 MG/ML
25 INJECTION INTRAMUSCULAR; INTRAVENOUS EVERY 6 HOURS PRN
Status: CANCELLED | OUTPATIENT
Start: 2024-06-19 | End: 2024-06-26

## 2024-06-19 RX ORDER — HYDROCODONE BITARTRATE AND ACETAMINOPHEN 5; 325 MG/1; MG/1
1 TABLET ORAL ONCE AS NEEDED
Status: DISCONTINUED | OUTPATIENT
Start: 2024-06-19 | End: 2024-06-19 | Stop reason: HOSPADM

## 2024-06-19 RX ORDER — SODIUM CHLORIDE, SODIUM LACTATE, POTASSIUM CHLORIDE, CALCIUM CHLORIDE 600; 310; 30; 20 MG/100ML; MG/100ML; MG/100ML; MG/100ML
9 INJECTION, SOLUTION INTRAVENOUS CONTINUOUS
Status: DISCONTINUED | OUTPATIENT
Start: 2024-06-19 | End: 2024-06-19 | Stop reason: HOSPADM

## 2024-06-19 RX ORDER — LIDOCAINE HYDROCHLORIDE 10 MG/ML
0.5 INJECTION, SOLUTION EPIDURAL; INFILTRATION; INTRACAUDAL; PERINEURAL ONCE AS NEEDED
Status: COMPLETED | OUTPATIENT
Start: 2024-06-19 | End: 2024-06-19

## 2024-06-19 RX ORDER — DROPERIDOL 2.5 MG/ML
0.62 INJECTION, SOLUTION INTRAMUSCULAR; INTRAVENOUS
Status: DISCONTINUED | OUTPATIENT
Start: 2024-06-19 | End: 2024-06-19 | Stop reason: HOSPADM

## 2024-06-19 RX ORDER — NALOXONE HCL 0.4 MG/ML
0.4 VIAL (ML) INJECTION AS NEEDED
Status: DISCONTINUED | OUTPATIENT
Start: 2024-06-19 | End: 2024-06-19 | Stop reason: HOSPADM

## 2024-06-19 RX ORDER — PROMETHAZINE HYDROCHLORIDE 25 MG/1
25 TABLET ORAL ONCE AS NEEDED
Status: DISCONTINUED | OUTPATIENT
Start: 2024-06-19 | End: 2024-06-19

## 2024-06-19 RX ORDER — NALOXONE HYDROCHLORIDE 4 MG/.1ML
SPRAY NASAL
Qty: 2 EACH | Refills: 0 | Status: SHIPPED | OUTPATIENT
Start: 2024-06-19

## 2024-06-19 RX ORDER — NALOXONE HCL 0.4 MG/ML
0.1 VIAL (ML) INJECTION
Status: CANCELLED | OUTPATIENT
Start: 2024-06-19

## 2024-06-19 RX ORDER — TRANEXAMIC ACID 10 MG/ML
1000 INJECTION, SOLUTION INTRAVENOUS ONCE
Status: COMPLETED | OUTPATIENT
Start: 2024-06-19 | End: 2024-06-19

## 2024-06-19 RX ORDER — SODIUM CHLORIDE 0.9 % (FLUSH) 0.9 %
10 SYRINGE (ML) INJECTION EVERY 12 HOURS SCHEDULED
Status: DISCONTINUED | OUTPATIENT
Start: 2024-06-19 | End: 2024-06-19 | Stop reason: HOSPADM

## 2024-06-19 RX ORDER — FENTANYL CITRATE 50 UG/ML
INJECTION, SOLUTION INTRAMUSCULAR; INTRAVENOUS
Status: COMPLETED
Start: 2024-06-19 | End: 2024-06-19

## 2024-06-19 RX ORDER — FENTANYL CITRATE 50 UG/ML
50 INJECTION, SOLUTION INTRAMUSCULAR; INTRAVENOUS
Status: DISCONTINUED | OUTPATIENT
Start: 2024-06-19 | End: 2024-06-19 | Stop reason: HOSPADM

## 2024-06-19 RX ORDER — LABETALOL HYDROCHLORIDE 5 MG/ML
5 INJECTION, SOLUTION INTRAVENOUS
Status: DISCONTINUED | OUTPATIENT
Start: 2024-06-19 | End: 2024-06-19 | Stop reason: HOSPADM

## 2024-06-19 RX ORDER — DEXAMETHASONE SODIUM PHOSPHATE 10 MG/ML
INJECTION, SOLUTION INTRAMUSCULAR; INTRAVENOUS
Status: COMPLETED | OUTPATIENT
Start: 2024-06-19 | End: 2024-06-19

## 2024-06-19 RX ORDER — PROPOFOL 10 MG/ML
VIAL (ML) INTRAVENOUS AS NEEDED
Status: DISCONTINUED | OUTPATIENT
Start: 2024-06-19 | End: 2024-06-19 | Stop reason: SURG

## 2024-06-19 RX ORDER — OXYCODONE HYDROCHLORIDE 5 MG/1
5 TABLET ORAL EVERY 4 HOURS PRN
Status: CANCELLED | OUTPATIENT
Start: 2024-06-19 | End: 2024-06-26

## 2024-06-19 RX ORDER — ONDANSETRON 4 MG/1
4 TABLET, FILM COATED ORAL EVERY 6 HOURS PRN
Qty: 28 TABLET | Refills: 1 | Status: SHIPPED | OUTPATIENT
Start: 2024-06-19 | End: 2024-06-26

## 2024-06-19 RX ORDER — ONDANSETRON 2 MG/ML
4 INJECTION INTRAMUSCULAR; INTRAVENOUS ONCE AS NEEDED
Status: DISCONTINUED | OUTPATIENT
Start: 2024-06-19 | End: 2024-06-19 | Stop reason: HOSPADM

## 2024-06-19 RX ORDER — ONDANSETRON 2 MG/ML
INJECTION INTRAMUSCULAR; INTRAVENOUS AS NEEDED
Status: DISCONTINUED | OUTPATIENT
Start: 2024-06-19 | End: 2024-06-19 | Stop reason: SURG

## 2024-06-19 RX ORDER — MEPERIDINE HYDROCHLORIDE 25 MG/ML
12.5 INJECTION INTRAMUSCULAR; INTRAVENOUS; SUBCUTANEOUS
Status: DISCONTINUED | OUTPATIENT
Start: 2024-06-19 | End: 2024-06-19 | Stop reason: HOSPADM

## 2024-06-19 RX ORDER — EPHEDRINE SULFATE 50 MG/ML
INJECTION INTRAVENOUS AS NEEDED
Status: DISCONTINUED | OUTPATIENT
Start: 2024-06-19 | End: 2024-06-19 | Stop reason: SURG

## 2024-06-19 RX ORDER — FENTANYL CITRATE 50 UG/ML
INJECTION, SOLUTION INTRAMUSCULAR; INTRAVENOUS
Status: COMPLETED | OUTPATIENT
Start: 2024-06-19 | End: 2024-06-19

## 2024-06-19 RX ORDER — MAGNESIUM HYDROXIDE 1200 MG/15ML
LIQUID ORAL AS NEEDED
Status: DISCONTINUED | OUTPATIENT
Start: 2024-06-19 | End: 2024-06-19 | Stop reason: HOSPADM

## 2024-06-19 RX ORDER — ACETAMINOPHEN 500 MG
1000 TABLET ORAL ONCE
Status: COMPLETED | OUTPATIENT
Start: 2024-06-19 | End: 2024-06-19

## 2024-06-19 RX ORDER — VANCOMYCIN/0.9 % SOD CHLORIDE 1.5G/250ML
15 PLASTIC BAG, INJECTION (ML) INTRAVENOUS ONCE
Status: CANCELLED | OUTPATIENT
Start: 2024-06-19

## 2024-06-19 RX ORDER — PROMETHAZINE HYDROCHLORIDE 25 MG/1
25 SUPPOSITORY RECTAL ONCE AS NEEDED
Status: DISCONTINUED | OUTPATIENT
Start: 2024-06-19 | End: 2024-06-19

## 2024-06-19 RX ORDER — IPRATROPIUM BROMIDE AND ALBUTEROL SULFATE 2.5; .5 MG/3ML; MG/3ML
3 SOLUTION RESPIRATORY (INHALATION) ONCE AS NEEDED
Status: DISCONTINUED | OUTPATIENT
Start: 2024-06-19 | End: 2024-06-19 | Stop reason: HOSPADM

## 2024-06-19 RX ORDER — FAMOTIDINE 20 MG/1
20 TABLET, FILM COATED ORAL ONCE
Status: COMPLETED | OUTPATIENT
Start: 2024-06-19 | End: 2024-06-19

## 2024-06-19 RX ORDER — DIPHENHYDRAMINE HCL 25 MG
25 CAPSULE ORAL EVERY 6 HOURS PRN
Status: CANCELLED | OUTPATIENT
Start: 2024-06-19 | End: 2024-06-26

## 2024-06-19 RX ORDER — MELOXICAM 15 MG/1
15 TABLET ORAL ONCE
Status: COMPLETED | OUTPATIENT
Start: 2024-06-19 | End: 2024-06-19

## 2024-06-19 RX ORDER — GLYCOPYRROLATE 0.2 MG/ML
INJECTION INTRAMUSCULAR; INTRAVENOUS AS NEEDED
Status: DISCONTINUED | OUTPATIENT
Start: 2024-06-19 | End: 2024-06-19 | Stop reason: SURG

## 2024-06-19 RX ORDER — MIDAZOLAM HYDROCHLORIDE 1 MG/ML
0.5 INJECTION INTRAMUSCULAR; INTRAVENOUS
Status: DISCONTINUED | OUTPATIENT
Start: 2024-06-19 | End: 2024-06-19 | Stop reason: HOSPADM

## 2024-06-19 RX ADMIN — ACETAMINOPHEN 1000 MG: 500 TABLET ORAL at 06:46

## 2024-06-19 RX ADMIN — Medication 1500 MG: at 06:40

## 2024-06-19 RX ADMIN — FAMOTIDINE 20 MG: 20 TABLET ORAL at 06:45

## 2024-06-19 RX ADMIN — FENTANYL CITRATE 50 MCG: 50 INJECTION, SOLUTION INTRAMUSCULAR; INTRAVENOUS at 09:35

## 2024-06-19 RX ADMIN — FENTANYL CITRATE 50 MCG: 50 INJECTION, SOLUTION INTRAMUSCULAR; INTRAVENOUS at 10:15

## 2024-06-19 RX ADMIN — FENTANYL CITRATE 50 MCG: 50 INJECTION, SOLUTION INTRAMUSCULAR; INTRAVENOUS at 10:54

## 2024-06-19 RX ADMIN — ROCURONIUM BROMIDE 50 MG: 10 INJECTION INTRAVENOUS at 07:35

## 2024-06-19 RX ADMIN — DEXAMETHASONE SODIUM PHOSPHATE 2 MG: 10 INJECTION, SOLUTION INTRAMUSCULAR; INTRAVENOUS at 07:10

## 2024-06-19 RX ADMIN — ONDANSETRON 4 MG: 2 INJECTION INTRAMUSCULAR; INTRAVENOUS at 09:00

## 2024-06-19 RX ADMIN — SODIUM CHLORIDE, POTASSIUM CHLORIDE, SODIUM LACTATE AND CALCIUM CHLORIDE 9 ML/HR: 600; 310; 30; 20 INJECTION, SOLUTION INTRAVENOUS at 06:44

## 2024-06-19 RX ADMIN — Medication 1000 MG: at 09:34

## 2024-06-19 RX ADMIN — TRANEXAMIC ACID 1000 MG: 10 INJECTION, SOLUTION INTRAVENOUS at 07:48

## 2024-06-19 RX ADMIN — BUPIVACAINE HYDROCHLORIDE 30 ML: 2.5 INJECTION, SOLUTION EPIDURAL; INFILTRATION; INTRACAUDAL at 07:10

## 2024-06-19 RX ADMIN — HYDROMORPHONE HYDROCHLORIDE 0.5 MG: 1 INJECTION, SOLUTION INTRAMUSCULAR; INTRAVENOUS; SUBCUTANEOUS at 10:43

## 2024-06-19 RX ADMIN — FENTANYL CITRATE 100 MCG: 50 INJECTION, SOLUTION INTRAMUSCULAR; INTRAVENOUS at 06:58

## 2024-06-19 RX ADMIN — HYDROMORPHONE HYDROCHLORIDE 0.5 MG: 1 INJECTION, SOLUTION INTRAMUSCULAR; INTRAVENOUS; SUBCUTANEOUS at 11:07

## 2024-06-19 RX ADMIN — HYDROMORPHONE HYDROCHLORIDE 0.5 MG: 1 INJECTION, SOLUTION INTRAMUSCULAR; INTRAVENOUS; SUBCUTANEOUS at 12:20

## 2024-06-19 RX ADMIN — LIDOCAINE HYDROCHLORIDE 0.5 ML: 10 INJECTION, SOLUTION EPIDURAL; INFILTRATION; INTRACAUDAL; PERINEURAL at 06:47

## 2024-06-19 RX ADMIN — MELOXICAM 15 MG: 15 TABLET ORAL at 06:45

## 2024-06-19 RX ADMIN — SUGAMMADEX 200 MG: 100 INJECTION, SOLUTION INTRAVENOUS at 09:07

## 2024-06-19 RX ADMIN — BUPIVACAINE HYDROCHLORIDE 8 ML: 2.5 INJECTION, SOLUTION EPIDURAL; INFILTRATION; INTRACAUDAL; PERINEURAL at 06:58

## 2024-06-19 RX ADMIN — SODIUM CHLORIDE, POTASSIUM CHLORIDE, SODIUM LACTATE AND CALCIUM CHLORIDE: 600; 310; 30; 20 INJECTION, SOLUTION INTRAVENOUS at 07:28

## 2024-06-19 RX ADMIN — EPHEDRINE SULFATE 5 MG: 50 INJECTION INTRAVENOUS at 08:04

## 2024-06-19 RX ADMIN — DROPERIDOL 0.62 MG: 2.5 INJECTION, SOLUTION INTRAMUSCULAR; INTRAVENOUS at 11:56

## 2024-06-19 RX ADMIN — SODIUM CHLORIDE 2 G: 900 INJECTION INTRAVENOUS at 07:40

## 2024-06-19 RX ADMIN — DEXAMETHASONE SODIUM PHOSPHATE 4 MG: 4 INJECTION INTRA-ARTICULAR; INTRALESIONAL; INTRAMUSCULAR; INTRAVENOUS; SOFT TISSUE at 08:05

## 2024-06-19 RX ADMIN — TRANEXAMIC ACID 1000 MG: 10 INJECTION, SOLUTION INTRAVENOUS at 08:53

## 2024-06-19 RX ADMIN — PREGABALIN 75 MG: 75 CAPSULE ORAL at 06:45

## 2024-06-19 RX ADMIN — LIDOCAINE HYDROCHLORIDE 50 MG: 10 INJECTION, SOLUTION EPIDURAL; INFILTRATION; INTRACAUDAL; PERINEURAL at 07:35

## 2024-06-19 RX ADMIN — GLYCOPYRROLATE 0.2 MCG: 0.2 INJECTION INTRAMUSCULAR; INTRAVENOUS at 08:15

## 2024-06-19 RX ADMIN — PROPOFOL 160 MG: 10 INJECTION, EMULSION INTRAVENOUS at 07:35

## 2024-06-19 NOTE — DISCHARGE INSTRUCTIONS
InfuBLOCK - Patient Information    What is a pain pump?  The InfuBLOCK pump delivers post-operative, non-narcotic, numbing medication to the nerve near the surgical site for pain relief.     Where can I find information about my pain pump?           For more information about your pain pump, scan the QR code.  For additional patient resources, visit [email or web address].                                                                                               While your physician is your primary source for information about your treatment there may be times during your treatment that you need assistance with your infusion pump.     If you need assistance take the following steps:    The [employer] Nursing Hotline is Here for You 24/7.  Please call 1-583.251.2671 for the following concerns or complications:    Answers to questions about your infusion pump                 Tubing disconnect  Assistance with pump alarms                                                      Dislodged catheter  Excessive leakage noted from pump                                         Inadequate pain control    2.   [facility] Anesthesia Acute Pain Service: [phone] is available 24/7 for any further needs or concerns about medication or pain control.     -------------------------------------------------------------------------    Nerve Catheter Removal Instructions  When your device is empty:    Remove your catheter by pulling the dressing off slowly (like you would remove a regular bandage). The catheter should pull right out of the skin.  Check that the BLUE tip is intact.                                                                                     If the catheter is stuck, reposition your   extremity and pull slowly until removed.  *If catheter is HURTING and WON'T come out, stop and call [phone] for further assistance.    Remove medication bag from the black carrying case.  Cut the  tubing on right and left side of pump, and discard the medication bag and tubing into garbage.  Place the pump and black carrying case into the plastic bag and then place this into the return box.  Seal box with blue stickers and return to [place] postal service. THIS IS PRE-PAID POSTAGE.        -------------------------------------------------------------------------    [facility] COLD THERAPY - PATIENT INSTRUCTION SHEET    Cold Compression Therapy for your comfort and rehabilitation  Your caregivers want you to be productive in your rehab and comfortable during your stay. In keeping with those goals, you will be receiving an SMI Cold Therapy Wrap to help ease post-operative pain and swelling that might keep you from getting back on track! Your SMI Cold Therapy Wrap is effective and simple-to-use, and you will be encouraged to apply it throughout your hospital stay and at home through the duration of your recovery.    When you are ready to go home  Be sure to take your SMI Cold Therapy Wrap and both sets of Gel Bags with you for continued comfort and use throughout your rehabilitation. If you don't already have them, ask your nurse or aide to retrieve your SMI Gel Bags from the patient freezer.    Home use precautions  Always follow your medical professional's application instructions upon discharge. Your SMI Cold Therapy Wrap and Gel Bags are designed to last for months following your surgery. Never heat the Gel Bags unless specified by your healthcare provider. Supervision is advised when using this product on children or geriatric patients. To avoid danger of suffocation, please keep the outer plastic packaging away from children & pets.    Cold Therapy Instructions  Place Gel Bags in a freezer set ¾ of the way to max temperature for at least (4) hours. For best results, lay the Gel Bags flat and [id number] in the freezer. Once frozen, slide Gel Bags into the gel pouch and secure your wrap to the affected area with the  straps.  Gel wraps that have been stored in a freezer for an extended period of time may require a (10) minute period of softening up in a room temperature environment before application.  The gel pouch acts as a protective barrier. NEVER place frozen bags directly onto skin, as this may cause frostbite injury.  The [facility] Cold Therapy Wrap is designed to be able to be worm while ambulating. The compression straps can be secured well enough so that the Wrap won't fall off while moving.  Wrap Application Videos can be viewed at [employer].  An additional protective barrier such as clothing, a washcloth, hand-towel or pillowcase may be used during prolonged treatment applications.  The Gel-Pouch and Wrap are both Latex-Free and the Gel Bag ingredients are non toxic.    [facility] Wrap care instructions  The [facility] Cold Therapy Wrap may be hand washed and hung to dry when needed.    [facility] re-order information  Additional [facility] body specific wraps and/or Gel Bags can be re-ordered from [employer] or call [employer] ([phone])

## 2024-06-19 NOTE — H&P
Referring Provider: Provider, No Known   Reason for Surgery: Left massive chronic irreparable rotator cuff tears, early rotator cuff tear arthropathy --reverse shoulder arthroplasty    Patient Care Team:  Estrada Teixeira PA as PCP - General (Physician Assistant)  Estrada Teixeira PA as PCP - Family Medicine (Physician Assistant)    Chief complaint - the patient presents for surgery-left reverse shoulder arthroplasty.      Since his clinic visit he did receive cardiac clearance and internal medicine clearance.  He had a hospitalization for pneumonia was ultimately cleared from his primary care team.  He presents today for left reverse shoulder arthroplasty.    Subjective .     History of present illness:  The patient presents for surgery.    Review of Systems  Pertinent items are noted in HPI and chart, along with clinic notes when available, all other systems reviewed and negative    History  Family History   Problem Relation Age of Onset    Diabetes Mother     Hypertension Mother     Cancer Mother     Hypertension Father      Social History     Socioeconomic History    Marital status:    Tobacco Use    Smoking status: Former     Current packs/day: 0.00     Average packs/day: 2.0 packs/day for 25.2 years (50.4 ttl pk-yrs)     Types: Cigarettes     Start date: 10/11/1971     Quit date: 1997     Years since quittin.4    Smokeless tobacco: Never   Vaping Use    Vaping status: Never Used   Substance and Sexual Activity    Alcohol use: No    Drug use: No    Sexual activity: Not Currently     Partners: Female     Past Surgical History:   Procedure Laterality Date    CARDIAC SURGERY      4 vessel bypass    NECK SURGERY      SHOULDER ARTHROSCOPY W/ ROTATOR CUFF REPAIR Left 2023    Procedure: LEFT SHOULDER ARTHROSCOPY WITH MINI OPEN ROTATOR CUFF REPAIR, ACROMIOPLASTY, EXCISION LATERAL CLAVICLE;  Surgeon: Steve Serna MD;  Location: [facility];  Service: Orthopedics;  Laterality:  Left;    TONSILLECTOMY       Past Medical History:   Diagnosis Date    Arthritis of back     Arthritis of neck     CAD (coronary artery disease)     s/p 4-vessel CABG and stenting    CHF (congestive heart failure)     Diabetes mellitus     Diverticula of colon     Elevated PSA     Frozen shoulder     GERD (gastroesophageal reflux disease)     Heart disease     Hip arthrosis     Hyperlipidemia     Hypertension     Knee swelling     Neck strain     CHANEL (obstructive sleep apnea)     on AutoPAP     Allergies   Allergen Reactions    Phenergan [Promethazine] Hallucinations       Current Facility-Administered Medications:     ceFAZolin 2000 mg IVPB in 100 mL NS (MBP), 2 g, Intravenous, Once, Marcel Vega MD    lactated ringers infusion, 9 mL/hr, Intravenous, Continuous, Davi Dean MD, Last Rate: 9 mL/hr at 06/19/24 0644, 9 mL/hr at 06/19/24 0644    midazolam (VERSED) injection 0.5 mg, 0.5 mg, Intravenous, Q10 Min PRN, Davi Dean MD    ropivacaine (NAROPIN) 0.2 % infusion (INFUSYSTEM), , Peripheral Nerve, Continuous, Ozzy Middleton CRNA    sodium chloride 0.9 % flush 10 mL, 10 mL, Intravenous, Q12H, Davi Dean MD    sodium chloride 0.9 % flush 10 mL, 10 mL, Intravenous, PRN, Davi Dean MD    sodium chloride 0.9 % infusion 40 mL, 40 mL, Intravenous, PRN, Davi Dean MD    tranexamic acid 1000 mg in 100 mL 0.7% NaCl infusion (premix), 1,000 mg, Intravenous, Once, Marcel Vega MD    tranexamic acid 1000 mg in 100 mL 0.7% NaCl infusion (premix), 1,000 mg, Intravenous, Once, Marcel Vega MD    vancomycin IVPB 1500 mg in 0.9% NaCl (Premix) 500 mL, 15 mg/kg, Intravenous, Once, Marcel Vega MD, Last Rate: 333.3 mL/hr at 06/19/24 0640, 1,500 mg at 06/19/24 0640      Objective     Vital Signs        Physical Exam:     General Appearance:    no acute distress   Head:    Normocephalic, without obvious abnormality, atraumatic   Ears:    Ears intact with no  abnormalities noted   Throat:   No oral lesions, no thrush, oral mucosa moist   Lungs:     respirations regular, even and unlabored    Heart:    Regular rhythm and normal rate   Chest Wall:    No abnormalities observed   Abdomen:     no guarding   Pulses: Well-perfused   Skin:   No bleeding, bruising or rash   Neurologic:   sensation intact distally     Orthopedic/MSK: Left shoulder pain     Results Review:   I reviewed the patient's new clinical results.               Assessment & Plan       S/P left rotator cuff repair    Nontraumatic complete tear of left rotator cuff    Status post left rotator cuff repair    Arthritis of shoulder region, left        I discussed the patient's findings and my recommendations with patient at length in the office.  Consent has been obtained.  The patient understands the risks and benefits of surgery versus nonoperative treatment.  The patient desired to proceed with surgery.    Left reverse shoulder arthroplasty    Marcel Vega MD  06/19/24  07:08 EDT

## 2024-06-19 NOTE — ANESTHESIA POSTPROCEDURE EVALUATION
Patient: Abraham Blanco    Procedure Summary       Date: 06/19/24 Room / Location:  [facility] /  [facility]    Anesthesia Start: 0728 Anesthesia Stop: 0935    Procedure: REVERSE TOTAL SHOULDER ARTHROPLASTY - LEFT (Left: Shoulder) Diagnosis:       Nontraumatic complete tear of left rotator cuff      Status post left rotator cuff repair      S/P left rotator cuff repair      (Nontraumatic complete tear of left rotator cuff [M75.122])      (Status post left rotator cuff repair [Z98.890])      (S/P left rotator cuff repair [Z98.890])    Surgeons: Marcel Vega MD Provider: Davi Dean MD    Anesthesia Type: general with block ASA Status: 3            Anesthesia Type: general with block    Vitals  Vitals Value Taken Time   /54 06/19/24 1300   Temp 97.2 °F (36.2 °C) 06/19/24 1230   Pulse 57 06/19/24 1321   Resp 12 06/19/24 1230   SpO2 95 % 06/19/24 1321   Vitals shown include unfiled device data.          Post Anesthesia Care and Evaluation    Patient location during evaluation: PACU  Patient participation: complete - patient participated  Level of consciousness: awake and alert  Pain management: adequate    Airway patency: patent  Anesthetic complications: No anesthetic complications  PONV Status: none  Cardiovascular status: hemodynamically stable and acceptable  Respiratory status: nonlabored ventilation, acceptable and nasal cannula  Hydration status: acceptable

## 2024-06-19 NOTE — ADDENDUM NOTE
Addendum  created 06/19/24 1372 by Zane Rahman CRNA    Clinical Note Signed, Diagnosis association updated, Intraprocedure Blocks edited

## 2024-06-19 NOTE — PLAN OF CARE
Goal Outcome Evaluation:  Plan of Care Reviewed With: patient, spouse           Outcome Evaluation: OT educated pt and family on shoulder precautions, ADL retraining to maintain, sling management and HEP. Post teaching, pt and spouse able to manage sling, UB dressing and HEP with supervision. Pt ambulated 225 feet and ascended/descended 2 steps with supervision- no PT needs. Desat to 81% on RA, improved to 90% or above in less than 1 minute of PLB/rest. RN notified. Pt meets criteria for same-day DC home from OT perspective and pt and spouse report feeling comfortable with planned DC home.      Anticipated Discharge Disposition (OT): home with 24/7 care

## 2024-06-19 NOTE — ANESTHESIA PROCEDURE NOTES
Airway  Urgency: elective    Date/Time: 6/19/2024 7:38 AM  Airway not difficult    General Information and Staff    Patient location during procedure: OR  SRNA: Lucinda Her SRNA  Indications and Patient Condition  Indications for airway management: airway protection    Preoxygenated: yes  MILS not maintained throughout  Mask difficulty assessment: 1 - vent by mask    Final Airway Details  Final airway type: endotracheal airway      Successful airway: ETT  Cuffed: yes   Successful intubation technique: direct laryngoscopy  Facilitating devices/methods: intubating stylet  Endotracheal tube insertion site: oral  Blade: Tayla  Blade size: 4  ETT size (mm): 7.5  Cormack-Lehane Classification: grade I - full view of glottis  Placement verified by: chest auscultation and capnometry   Cuff volume (mL): 8  Measured from: lips  ETT/EBT  to lips (cm): 23  Number of attempts at approach: 1  Assessment: lips, teeth, and gum same as pre-op and atraumatic intubation    Additional Comments  Negative epigastric sounds, Breath sound equal bilaterally with symmetric chest rise and fall

## 2024-06-19 NOTE — THERAPY EVALUATION
Patient Name: Abraham Blanco  : 1955    MRN: [id number]                              Today's Date: 2024       Admit Date: 2024    Visit Dx:     ICD-10-CM ICD-9-CM   1. Arthritis of shoulder region, left  M19.012 716.91   2. Nontraumatic complete tear of left rotator cuff  M75.122 727.61   3. Status post left rotator cuff repair  Z98.890 V45.89   4. S/P left rotator cuff repair  Z98.890 V45.89     Patient Active Problem List   Diagnosis    Prostate cancer    Acute pancreatitis without infection or necrosis    Rotator cuff tear, non-traumatic, left    Nontraumatic complete tear of left rotator cuff    Status post left rotator cuff repair    Arthritis of shoulder region, left    Pseudoparalysis    S/P left rotator cuff repair    Acute respiratory failure with hypoxemia     Past Medical History:   Diagnosis Date    Arthritis of back     Arthritis of neck     CAD (coronary artery disease)     s/p 4-vessel CABG and stenting    CHF (congestive heart failure)     Diabetes mellitus     Diverticula of colon     Elevated PSA     Frozen shoulder     GERD (gastroesophageal reflux disease)     Heart disease     Hip arthrosis     Hyperlipidemia     Hypertension     Knee swelling     Neck strain     CHANEL (obstructive sleep apnea)     on AutoPAP     Past Surgical History:   Procedure Laterality Date    CARDIAC SURGERY      4 vessel bypass    NECK SURGERY      SHOULDER ARTHROSCOPY W/ ROTATOR CUFF REPAIR Left 2023    Procedure: LEFT SHOULDER ARTHROSCOPY WITH MINI OPEN ROTATOR CUFF REPAIR, ACROMIOPLASTY, EXCISION LATERAL CLAVICLE;  Surgeon: Steve Serna MD;  Location: [facility];  Service: Orthopedics;  Laterality: Left;    TONSILLECTOMY        General Information       Row Name 24 1316          OT Time and Intention    Document Type evaluation  -AR     Mode of Treatment individual therapy;occupational therapy  -AR       Row Name 24 1316          General Information    Patient Profile  Reviewed yes  -AR     Prior Level of Function independent:;all household mobility;community mobility;gait;transfer;min assist:;ADL's  -AR     Existing Precautions/Restrictions fall;oxygen therapy device and L/min;left;non-weight bearing;shoulder;other (see comments)  interscalene nerve catheter, Donjoy III with pillow  -AR     Barriers to Rehab none identified  -AR       Row Name 06/19/24 1316          Living Environment    People in Home spouse  -AR       Row Name 06/19/24 1316          Home Main Entrance    Number of Stairs, Main Entrance three  -AR     Stair Railings, Main Entrance railings on both sides of stairs  -AR       Row Name 06/19/24 1316          Stairs Within Home, Primary    Number of Stairs, Within Home, Primary none  -AR       Row Name 06/19/24 1316          Cognition    Orientation Status (Cognition) oriented x 4  -AR       Row Name 06/19/24 1316          Safety Issues, Functional Mobility    Safety Issues Affecting Function (Mobility) awareness of need for assistance;impulsivity;judgment;sequencing abilities  -AR     Impairments Affecting Function (Mobility) balance;endurance/activity tolerance;pain;range of motion (ROM);sensation/sensory awareness;strength;shortness of breath  -AR               User Key  (r) = Recorded By, (t) = Taken By, (c) = Cosigned By      Initials Name Provider Type    AR Veronica Harding OT Occupational Therapist                     Mobility/ADL's       Row Name 06/19/24 1325          Bed Mobility    Bed Mobility scooting/bridging;supine-sit;sit-supine  -AR     Scooting/Bridging [place] (Bed Mobility) contact guard;verbal cues  -AR     Supine-Sit [place] (Bed Mobility) minimum assist (75% patient effort);verbal cues  -AR     Sit-Supine [place] (Bed Mobility) minimum assist (75% patient effort);verbal cues  -AR     Comment, (Bed Mobility) OT educated pt on importance of maintaining NWB  -AR       Row Name 06/19/24 1325          Transfers    Transfers  sit-stand transfer;stand-sit transfer  -AR     Comment, (Transfers) Educated pt on importance of attending to interscalene nerve catheter to avoid dislodgement.  -AR       Row Name 06/19/24 1325          Sit-Stand Transfer    Sit-Stand [place] (Transfers) minimum assist (75% patient effort);2 person assist  RUE HHA  -AR     Assistive Device (Sit-Stand Transfers) other (see comments)  -AR       Row Name 06/19/24 1325          Stand-Sit Transfer    Stand-Sit [place] (Transfers) minimum assist (75% patient effort);2 person assist;verbal cues  -AR     Assistive Device (Stand-Sit Transfers) other (see comments)  -AR       Row Name 06/19/24 1325          Functional Mobility    Functional Mobility- Ind. Level moderate assist (50% patient effort);2 person assist required  -AR     Functional Mobility- Device other (see comments)  -AR     Functional Mobility-Distance (Feet) 40  -AR     Functional Mobility- Comment Pt scored 17 on mobility screen, limited with diziness and c/o nausea. RN aware and pt assisted to supine.  -AR       Row Name 06/19/24 1325          Activities of Daily Living    BADL Assessment/Intervention lower body dressing  -AR       Row Name 06/19/24 1325          Lower Body Dressing Assessment/Training    [place] Level (Lower Body Dressing) don;pants/bottoms;moderate assist (50% patient effort)  -AR     Position (Lower Body Dressing) edge of bed sitting;supported standing  -AR               User Key  (r) = Recorded By, (t) = Taken By, (c) = Cosigned By      Initials Name Provider Type    AR Veronica Harding, OT Occupational Therapist                   Obj/Interventions       Row Name 06/19/24 1327          Sensory Assessment (Somatosensory)    Sensory Assessment (Somatosensory) left UE  -AR     Sensory Subjective Reports numbness  -AR       Row Name 06/19/24 1327          Vision Assessment/Intervention    Visual Impairment/Limitations WNL  -AR     Vision Assessment Comment Pt needed  reminders during ambulation to keep eyes open  -AR       Row Name 06/19/24 1327          Range of Motion Comprehensive    Comment, General Range of Motion TBA  -AR       Row Name 06/19/24 1327          Strength Comprehensive (MMT)    Comment, General Manual Muscle Testing (MMT) Assessment TBA  -AR       [facility] Name 06/19/24 1327          Motor Skills    Therapeutic Exercise --  deferred d/t medical status  -AR       [facility] Name 06/19/24 1327          Balance    Balance Assessment sitting static balance;sitting dynamic balance;standing static balance;standing dynamic balance  -AR     Static Sitting Balance contact guard  -AR     Dynamic Sitting Balance minimal assist  -AR     Position, Sitting Balance unsupported;sitting edge of bed  -AR     Static Standing Balance minimal assist  -AR     Dynamic Standing Balance moderate assist;2-person assist  -AR     Position/Device Used, Standing Balance unsupported  -AR               User Key  (r) = Recorded By, (t) = Taken By, (c) = Cosigned By      Initials Name Provider Type    Veronica Mackey, OT Occupational Therapist                   Goals/Plan       Row Name 06/19/24 1333          Transfer Goal 1 (OT)    Activity/Assistive Device (Transfer Goal 1, OT) sit-to-stand/stand-to-sit;toilet  -AR     [place] Level/Cues Needed (Transfer Goal 1, OT) supervision required;verbal cues required  -AR     Time Frame (Transfer Goal 1, OT) long term goal (LTG);3 days  -AR     Progress/Outcome (Transfer Goal 1, OT) goal ongoing  -AR       Row Name 06/19/24 1333          Dressing Goal 1 (OT)    Time Frame (Dressing Goal 1, OT) short term goal (STG);1 day  -AR     Strategies/Barriers (Dressing Goal 1, OT) Pt and/or family will don/doff sling with supervision  -AR     Progress/Outcome (Dressing Goal 1, OT) goal ongoing  -AR       Row Name 06/19/24 1333          ROM Goal 1 (OT)    Time Frame (ROM Goal 1, OT) short term goal (STG);1 day  -AR     Strategies/Barriers (ROM Goal 1, OT) Pt  and/or family will complete UE HEP within physician parameters with supervison  -AR     Progress/Outcome (ROM Goal 1, OT) goal ongoing  -AR       Row Name 06/19/24 1333          Therapy Assessment/Plan (OT)    Planned Therapy Interventions (OT) activity tolerance training;BADL retraining;edema control/reduction;functional balance retraining;IADL retraining;occupation/activity based interventions;orthotic fabrication/fitting/training;patient/caregiver education/training;ROM/therapeutic exercise;transfer/mobility retraining  -AR               User Key  (r) = Recorded By, (t) = Taken By, (c) = Cosigned By      Initials Name Provider Type    AR Veronica Harding, OT Occupational Therapist                   Clinical Impression       Row Name 06/19/24 1328          Pain Assessment    Pretreatment Pain Rating 4/10  -AR     Posttreatment Pain Rating 7/10  -AR     Pain Location - Side/Orientation Left  -AR     Pain Location generalized  -AR     Pain Location - shoulder  -AR     Pain Intervention(s) Medication (See MAR);Cold applied;Repositioned;Ambulation/increased activity;Nursing Notified  -AR       Row Name 06/19/24 1326          Plan of Care Review    Plan of Care Reviewed With patient;spouse  -AR     Outcome Evaluation Pt completed bed mobility with min assist, LB dressing with mod assist and ambulated 40 feet with min assist x2. Pt limited with c/o increased pain, nausea and dizziness. Pt desat to 86% on RA, /67 and pt uable to continue with session. Returned to supine, RN to notify OT when pt is appropriate for skilled intervention.  -AR       Row Name 06/19/24 1327          Therapy Assessment/Plan (OT)    Patient/Family Therapy Goal Statement (OT) Go home today  -AR     Rehab Potential (OT) good, to achieve stated therapy goals  -AR     Criteria for Skilled Therapeutic Interventions Met (OT) yes  -AR     Therapy Frequency (OT) daily  -AR     Predicted Duration of Therapy Intervention (OT) 3 days  -AR        Row Name 06/19/24 1328          Therapy Plan Review/Discharge Plan (OT)    Anticipated Discharge Disposition (OT) home with 24/7 care  -AR       Row Name 06/19/24 1328          Vital Signs    Pre Systolic BP Rehab 134  -AR     Pre Treatment Diastolic BP 64  -AR     Intra Systolic BP Rehab 160  -AR     Intra Treatment Diastolic BP 67  -AR     Post Systolic BP Rehab 142  -AR     Post Treatment Diastolic BP 69  -AR     Intratreatment Heart Rate (beats/min) 55  -AR     Posttreatment Heart Rate (beats/min) 52  -AR     Pre SpO2 (%) 92  3L NC  -AR     O2 Delivery Pre Treatment supplemental O2  -AR     Intra SpO2 (%) 86  -AR     O2 Delivery Intra Treatment room air  -AR     Post SpO2 (%) 91  -AR     O2 Delivery Post Treatment supplemental O2  -AR     Pre Patient Position Supine  -AR     Intra Patient Position Standing  -AR     Post Patient Position Supine  -AR       Row Name 06/19/24 1328          Positioning and Restraints    Pre-Treatment Position in bed  -AR     Post Treatment Position bed  -AR     In Bed notified nsg;supine;with brace;with family/caregiver;with nsg  -AR               User Key  (r) = Recorded By, (t) = Taken By, (c) = Cosigned By      Initials Name Provider Type    Veronica Mackey, OT Occupational Therapist                   Outcome Measures       Row Name 06/19/24 1333          How much help from another is currently needed...    Putting on and taking off regular lower body clothing? 2  -AR     Bathing (including washing, rinsing, and drying) 2  -AR     Toileting (which includes using toilet bed pan or urinal) 2  -AR     Putting on and taking off regular upper body clothing 2  -AR     Taking care of personal grooming (such as brushing teeth) 3  -AR     Eating meals 3  -AR     AM-PAC 6 Clicks Score (OT) 14  -AR       Row Name 06/19/24 1333          Functional Assessment    Outcome Measure Options AM-PAC 6 Clicks Daily Activity (OT)  -AR               User Key  (r) = Recorded By, (t) = Taken By,  (c) = Cosigned By      Initials Name Provider Type    AR Veronica Harding OT Occupational Therapist                    Occupational Therapy Education       Title: PT OT SLP Therapies (Done)       Topic: Occupational Therapy (Done)       Point: ADL training (Done)       Description:   Instruct learner(s) on proper safety adaptation and remediation techniques during self care or transfers.   Instruct in proper use of assistive devices.                  Learning Progress Summary             Patient Eager, E,TB,D,H, VU,NR by AR at 6/19/2024 1334   Family Eager, E,TB,D,H, VU,NR by AR at 6/19/2024 1334                         Point: Home exercise program (Done)       Description:   Instruct learner(s) on appropriate technique for monitoring, assisting and/or progressing therapeutic exercises/activities.                  Learning Progress Summary             Patient Eager, E,TB,D,H, VU,NR by AR at 6/19/2024 1334   Family Eager, E,TB,D,H, VU,NR by AR at 6/19/2024 1334                         Point: Precautions (Done)       Description:   Instruct learner(s) on prescribed precautions during self-care and functional transfers.                  Learning Progress Summary             Patient Eager, E,TB,D,H, VU,NR by AR at 6/19/2024 1334   Family Eager, E,TB,D,H, VU,NR by AR at 6/19/2024 1334                         Point: Body mechanics (Done)       Description:   Instruct learner(s) on proper positioning and spine alignment during self-care, functional mobility activities and/or exercises.                  Learning Progress Summary             Patient Eager, E,TB,D,H, VU,NR by AR at 6/19/2024 1334   Family Eager, E,TB,D,H, VU,NR by AR at 6/19/2024 1334                                         User Key       Initials Effective Dates Name Provider Type Discipline    AR 07/11/23 -  Veronica Harding OT Occupational Therapist OT                  OT Recommendation and Plan  Planned Therapy Interventions (OT): activity  tolerance training, BADL retraining, edema control/reduction, functional balance retraining, IADL retraining, occupation/activity based interventions, orthotic fabrication/fitting/training, patient/caregiver education/training, ROM/therapeutic exercise, transfer/mobility retraining  Therapy Frequency (OT): daily  Plan of Care Review  Plan of Care Reviewed With: patient, spouse  Outcome Evaluation: Pt completed bed mobility with min assist, LB dressing with mod assist and ambulated 40 feet with min assist x2. Pt limited with c/o increased pain, nausea and dizziness. Pt desat to 86% on RA, /67 and pt uable to continue with session. Returned to supine, RN to notify OT when pt is appropriate for skilled intervention.     Time Calculation:   Evaluation Complexity (OT)  Review Occupational Profile/Medical/Therapy History Complexity: brief/low complexity  Assessment, Occupational Performance/Identification of Deficit Complexity: 1-3 performance deficits  Clinical Decision Making Complexity (OT): problem focused assessment/low complexity  Overall Complexity of Evaluation (OT): low complexity     Time Calculation- OT       Row Name 06/19/24 1335             Time Calculation- OT    OT Start Time 1130  -AR      OT Received On 06/19/24  -AR      OT Goal Re-Cert Due Date 06/29/24  -AR         Timed Charges    49935 - OT Therapeutic Activity Minutes 11  -AR         Untimed Charges    OT Eval/Re-eval Minutes 60  -AR         Total Minutes    Timed Charges Total Minutes 11  -AR      Untimed Charges Total Minutes 60  -AR       Total Minutes 71  -AR                User Key  (r) = Recorded By, (t) = Taken By, (c) = Cosigned By      Initials Name Provider Type    AR Veronica Harding OT Occupational Therapist                  Therapy Charges for Today       Code Description Service Date Service Provider Modifiers Qty    75812701575  OT THERAPEUTIC ACT EA 15 MIN 6/19/2024 Veronica Harding OT GO 1    [id number]  OT EVAL  LOW COMPLEXITY 4 6/19/2024 Veronica Harding OT GO 1    [id number] HC OT THER SUPP EA 15 MIN 6/19/2024 Veronica Harding OT GO 3                 Veronica Harding OT  6/19/2024

## 2024-06-19 NOTE — ANESTHESIA PROCEDURE NOTES
Left ISC      Patient reassessed immediately prior to procedure    Patient location during procedure: pre-op  Start time: 6/19/2024 6:58 AM  Reason for block: at surgeon's request and post-op pain management  Performed by  CRNA/CAA: Ozzy Middleton, QIAN  Assisted by: Luiza Iverson RN  Preanesthetic Checklist  Completed: patient identified, IV checked, site marked, risks and benefits discussed, surgical consent, monitors and equipment checked, pre-op evaluation and timeout performed  Prep:  Pt Position: right lateral decubitus  Sterile barriers:cap, gloves, mask and washed/disinfected hands  Prep: ChloraPrep  Patient monitoring: blood pressure monitoring, continuous pulse oximetry and EKG  Procedure    Sedation: yes  Performed under: local infiltration  Guidance:ultrasound guided    ULTRASOUND INTERPRETATION.  Using ultrasound guidance a 20 G gauge needle was placed in close proximity to the brachial plexus nerve, at which point, under ultrasound guidance anesthetic was injected in the area of the nerve and spread of the anesthesia was seen on ultrasound in close proximity thereto.  There were no abnormalities seen on ultrasound; a digital image was taken; and the patient tolerated the procedure with no complications. Images:still images obtained, printed/placed on chart    Laterality:left  Block Type:interscalene  Injection Technique:catheter  Needle Type:Tuohy and echogenic  Needle Gauge:18 G  Resistance on Injection: none  Catheter Size:20 G (20g)  Cath Depth at skin: 9 cm    Medications Used: bupivacaine PF (MARCAINE) 0.25 % injection - Injection   8 mL - 6/19/2024 6:58:00 AM  fentaNYL citrate (PF) (SUBLIMAZE) injection - Intravenous   100 mcg - 6/19/2024 6:58:00 AM      Medications  Preservative Free Saline:5ml    Post Assessment  Injection Assessment: negative aspiration for heme, no paresthesia on injection and incremental injection  Patient Tolerance:comfortable throughout  "block  Complications:no  Additional Notes  CATHETER  A high-frequency linear transducer, with sterile cover, was placed in the supraclavicular fossa to identify the subclavian artery and trunks and divisions of the brachial plexus. The transducer was then moved in a cephalad orientation with a slight rotation to continue visualization of the brachial plexus from the trunks and divisions, on to the C5-C7 roots. The insertion site was prepped and draped in sterile fashion. Skin and cutaneous tissue was infiltrated with 2-5 ml of 1% Lidocaine. Using ultrasound-guidance, an 18-gauge Contiplex Ultra 360 Touhy needle was advanced in plane from lateral to medial. Preservative-free normal saline was utilized for hydro-dissection of tissue, advancement of Touhy, and to confirm final needle placement at the fascial plane between the middle scalene muscle and sheath of the brachial plexus (C5-C7). A 20-gauge Contiplex Echo catheter was placed through the needle and advance out the tip of the Touhy 3-5 cm with the \"Reed Flip\". The Touhy needle was then removed, and final catheter position verified lateral to the brachial plexus with local anesthetic (LA) and ultrasound visualization. The catheter was secured in the usual fashion with skin glue, benzoin, steri-strips, CHG tegaderm and label noting \"Nerve Block Catheter\". Jerk tape applied at yellow connector and catheter connection. All LA was injected in increments of 3-5 ml after catheter secured. Aspiration every 5 ml to prevent intravascular injection. Injection was completed with negative aspiration of blood and negative intravascular injection. Injection pressures were normal with minimal resistance.   Performed by: Lucinda Her, SRNA          "

## 2024-06-19 NOTE — PLAN OF CARE
Goal Outcome Evaluation:  Plan of Care Reviewed With: patient, spouse           Outcome Evaluation: Pt completed bed mobility with min assist, LB dressing with mod assist and ambulated 40 feet with min assist x2. Pt limited with c/o increased pain, nausea and dizziness. Pt desat to 86% on RA, /67 and pt uable to continue with session. Returned to supine, RN to notify OT when pt is appropriate for skilled intervention.      Anticipated Discharge Disposition (OT): home with 24/7 care

## 2024-06-19 NOTE — THERAPY EVALUATION
Patient Name: Abraham Blanco  : 1955    MRN: [id number]                              Today's Date: 2024       Admit Date: 2024    Visit Dx:     ICD-10-CM ICD-9-CM   1. Arthritis of shoulder region, left  M19.012 716.91   2. Nontraumatic complete tear of left rotator cuff  M75.122 727.61   3. Status post left rotator cuff repair  Z98.890 V45.89   4. S/P left rotator cuff repair  Z98.890 V45.89     Patient Active Problem List   Diagnosis    Prostate cancer    Acute pancreatitis without infection or necrosis    Rotator cuff tear, non-traumatic, left    Nontraumatic complete tear of left rotator cuff    Status post left rotator cuff repair    Arthritis of shoulder region, left    Pseudoparalysis    S/P left rotator cuff repair    Acute respiratory failure with hypoxemia     Past Medical History:   Diagnosis Date    Arthritis of back     Arthritis of neck     CAD (coronary artery disease)     s/p 4-vessel CABG and stenting    CHF (congestive heart failure)     Diabetes mellitus     Diverticula of colon     Elevated PSA     Frozen shoulder     GERD (gastroesophageal reflux disease)     Heart disease     Hip arthrosis     Hyperlipidemia     Hypertension     Knee swelling     Neck strain     CHANEL (obstructive sleep apnea)     on AutoPAP     Past Surgical History:   Procedure Laterality Date    CARDIAC SURGERY      4 vessel bypass    NECK SURGERY      SHOULDER ARTHROSCOPY W/ ROTATOR CUFF REPAIR Left 2023    Procedure: LEFT SHOULDER ARTHROSCOPY WITH MINI OPEN ROTATOR CUFF REPAIR, ACROMIOPLASTY, EXCISION LATERAL CLAVICLE;  Surgeon: Steve Serna MD;  Location: [facility];  Service: Orthopedics;  Laterality: Left;    TONSILLECTOMY        General Information       Row Name 24 1641 24 1316       OT Time and Intention    Document Type therapy note (daily note)  -AR evaluation  -AR    Mode of Treatment individual therapy;occupational therapy  -AR individual therapy;occupational  therapy  -AR      Row Name 06/19/24 1641 06/19/24 1316       General Information    Patient Profile Reviewed yes  -AR yes  -AR    Prior Level of Function -- independent:;all household mobility;community mobility;gait;transfer;min assist:;ADL's  -AR    Existing Precautions/Restrictions fall;left;non-weight bearing;shoulder;other (see comments)  interscalene nerve catheter, [place] III with pillow  -AR fall;oxygen therapy device and L/min;left;non-weight bearing;shoulder;other (see comments)  interscalene nerve catheter, Donjoy III with pillow  -AR    Barriers to Rehab -- none identified  -AR      Row Name 06/19/24 1316          Living Environment    People in Home spouse  -AR       Row Name 06/19/24 1316          Home Main Entrance    Number of Stairs, Main Entrance three  -AR     Stair Railings, Main Entrance railings on both sides of stairs  -AR       Row Name 06/19/24 1316          Stairs Within Home, Primary    Number of Stairs, Within Home, Primary none  -AR       Row Name 06/19/24 1641 06/19/24 1316       Cognition    Orientation Status (Cognition) oriented x 4  -AR oriented x 4  -AR      Row Name 06/19/24 1641 06/19/24 1316       Safety Issues, Functional Mobility    Safety Issues Affecting Function (Mobility) -- awareness of need for assistance;impulsivity;judgment;sequencing abilities  -AR    Impairments Affecting Function (Mobility) pain;range of motion (ROM);sensation/sensory awareness;strength  -AR balance;endurance/activity tolerance;pain;range of motion (ROM);sensation/sensory awareness;strength;shortness of breath  -AR              User Key  (r) = Recorded By, (t) = Taken By, (c) = Cosigned By      Initials Name Provider Type    AR Veronica Harding OT Occupational Therapist                     Mobility/ADL's       Row Name 06/19/24 1642 06/19/24 1325       Bed Mobility    Bed Mobility -- scooting/bridging;supine-sit;sit-supine  -AR    Scooting/Bridging [place] (Bed Mobility) -- contact  guard;verbal cues  -AR    Supine-Sit [place] (Bed Mobility) -- minimum assist (75% patient effort);verbal cues  -AR    Sit-Supine [place] (Bed Mobility) -- minimum assist (75% patient effort);verbal cues  -AR    Comment, (Bed Mobility) OT educated pt and spouse on importance of maintaining NWB and safe sleeping position.  -AR OT educated pt on importance of maintaining NWB  -AR      Row Name 06/19/24 1642 06/19/24 1325       Transfers    Transfers sit-stand transfer;stand-sit transfer  -AR sit-stand transfer;stand-sit transfer  -AR    Comment, (Transfers) Educated pt on importance of attending to interscalene nerve catheter to avoid dislodgement.  -AR Educated pt on importance of attending to interscalene nerve catheter to avoid dislodgement.  -AR      Row Name 06/19/24 1642 06/19/24 1325       Sit-Stand Transfer    Sit-Stand [place] (Transfers) supervision  -AR minimum assist (75% patient effort);2 person assist  RUE HHA  -AR    Assistive Device (Sit-Stand Transfers) -- other (see comments)  -AR      Row Name 06/19/24 1642 06/19/24 1325       Stand-Sit Transfer    Stand-Sit [place] (Transfers) supervision  -AR minimum assist (75% patient effort);2 person assist;verbal cues  -AR    Assistive Device (Stand-Sit Transfers) -- other (see comments)  -AR      Row Name 06/19/24 1642 06/19/24 1325       Functional Mobility    Functional Mobility- Ind. Level supervision required  -AR moderate assist (50% patient effort);2 person assist required  -AR    Functional Mobility- Device -- other (see comments)  -AR    Functional Mobility-Distance (Feet) 225  -AR 40  -AR    Functional Mobility- Comment Pt ascended/descended 2 steps with supervision and passed mobility screen, no PT needs. Desat to 81% on RA and pt asymptomatic. Improved to 90% or above in less than 1 minutes rest break and PLB. RN aware.  -AR Pt scored 17 on mobility screen, limited with diziness and c/o nausea. RN aware and pt assisted to  supine.  -AR    Patient was able to Ambulate yes  -AR --      Row Name 06/19/24 1642 06/19/24 1325       Activities of Daily Living    BADL Assessment/Intervention upper body dressing;bathing  -AR lower body dressing  -AR      Row Name 06/19/24 1642          Mobility    Extremity Weight-bearing Status left upper extremity  -AR     Left Upper Extremity (Weight-bearing Status) non weight-bearing (NWB)  -AR       Row Name 06/19/24 1642          Upper Body Dressing Assessment/Training    [place] Level (Upper Body Dressing) don;front opening garment;moderate assist (50% patient effort);maximum assist (25% patient effort)  sling  -AR     Position (Upper Body Dressing) edge of bed sitting  -AR     Comment, (Upper Body Dressing) Educated pt and spouse on shoulder precautions, ADL retraining to maintain, sling management and care of interscalene nerve catheter during ADLs to avoid dislodgement. Post teaching, spouse and pt able to manage UB dressing and don sling with supervision.  -AR       Row Name 06/19/24 1642          Bathing Assessment/Intervention    Comment, (Bathing) Educated pt and spouse on shoulder precautions and axilla care to maintain, reviewed that nerve catheter cannot get wet.  -AR       Row Name 06/19/24 1325          Lower Body Dressing Assessment/Training    [place] Level (Lower Body Dressing) don;pants/bottoms;moderate assist (50% patient effort)  -AR     Position (Lower Body Dressing) edge of bed sitting;supported standing  -AR               User Key  (r) = Recorded By, (t) = Taken By, (c) = Cosigned By      Initials Name Provider Type    Veronica Mackey OT Occupational Therapist                   Obj/Interventions       Row Name 06/19/24 1645 06/19/24 1327       Sensory Assessment (Somatosensory)    Sensory Assessment (Somatosensory) left UE  -AR left UE  -AR    Sensory Subjective Reports numbness  -AR numbness  -AR      Row Name 06/19/24 1327          Vision Assessment/Intervention     Visual Impairment/Limitations WNL  -AR     Vision Assessment Comment Pt needed reminders during ambulation to keep eyes open  -AR       Row Name 06/19/24 1645 06/19/24 1327       Range of Motion Comprehensive    Comment, General Range of Motion RUE WNL, L elbow/wrist/hand WNL  -AR TBA  -AR      Row Name 06/19/24 1645 06/19/24 1327       Strength Comprehensive (MMT)    Comment, General Manual Muscle Testing (MMT) Assessment RUE WNL, LUE deferred  -AR TBA  -AR      Row Name 06/19/24 1645          Elbow/Forearm (Therapeutic Exercise)    Elbow/Forearm (Therapeutic Exercise) AAROM (active assistive range of motion)  -AR     Elbow/Forearm AAROM (Therapeutic Exercise) left;flexion;extension;supination;pronation;sitting;10 repetitions  -AR       Row Name 06/19/24 1645          Wrist (Therapeutic Exercise)    Wrist (Therapeutic Exercise) AROM (active range of motion)  -AR     Wrist AROM (Therapeutic Exercise) left;flexion;extension;10 repetitions  -AR       Row Name 06/19/24 1645          Hand (Therapeutic Exercise)    Hand (Therapeutic Exercise) AROM (active range of motion)  -AR     Hand AROM/AAROM (Therapeutic Exercise) left;AROM (active range of motion);finger extension;finger flexion;10 repetitions  -AR       Row Name 06/19/24 1645 06/19/24 1327       Motor Skills    Therapeutic Exercise elbow/forearm;wrist;hand  Issued and reviewed written HEP  -AR --  deferred d/t medical status  -AR      Row Name 06/19/24 1645 06/19/24 1327       Balance    Balance Assessment sitting static balance;sitting dynamic balance;standing static balance;standing dynamic balance  -AR sitting static balance;sitting dynamic balance;standing static balance;standing dynamic balance  -AR    Static Sitting Balance supervision  -AR contact guard  -AR    Dynamic Sitting Balance supervision  -AR minimal assist  -AR    Position, Sitting Balance unsupported;sitting edge of bed  -AR unsupported;sitting edge of bed  -AR    Static Standing Balance  supervision  -AR minimal assist  -AR    Dynamic Standing Balance supervision  -AR moderate assist;2-person assist  -AR    Position/Device Used, Standing Balance unsupported  -AR unsupported  -AR    Comment, Balance Issued gait belt for home use and educated on use  -AR --              User Key  (r) = Recorded By, (t) = Taken By, (c) = Cosigned By      Initials Name Provider Type    AR Veronica Harding, OT Occupational Therapist                   Goals/Plan       Row Name 06/19/24 1333          Transfer Goal 1 (OT)    Activity/Assistive Device (Transfer Goal 1, OT) sit-to-stand/stand-to-sit;toilet  -AR     [place] Level/Cues Needed (Transfer Goal 1, OT) supervision required;verbal cues required  -AR     Time Frame (Transfer Goal 1, OT) long term goal (LTG);3 days  -AR     Progress/Outcome (Transfer Goal 1, OT) goal ongoing  -AR       Row Name 06/19/24 1333          Dressing Goal 1 (OT)    Time Frame (Dressing Goal 1, OT) short term goal (STG);1 day  -AR     Strategies/Barriers (Dressing Goal 1, OT) Pt and/or family will don/doff sling with supervision  -AR     Progress/Outcome (Dressing Goal 1, OT) goal ongoing  -AR       Row Name 06/19/24 1333          ROM Goal 1 (OT)    Time Frame (ROM Goal 1, OT) short term goal (STG);1 day  -AR     Strategies/Barriers (ROM Goal 1, OT) Pt and/or family will complete UE HEP within physician parameters with supervison  -AR     Progress/Outcome (ROM Goal 1, OT) goal ongoing  -AR       Row Name 06/19/24 1333          Therapy Assessment/Plan (OT)    Planned Therapy Interventions (OT) activity tolerance training;BADL retraining;edema control/reduction;functional balance retraining;IADL retraining;occupation/activity based interventions;orthotic fabrication/fitting/training;patient/caregiver education/training;ROM/therapeutic exercise;transfer/mobility retraining  -AR               User Key  (r) = Recorded By, (t) = Taken By, (c) = Cosigned By      Initials Name Provider Type     Veronica Mackey, OT Occupational Therapist                   Clinical Impression       Row Name 06/19/24 1647 06/19/24 1328       Pain Assessment    Pretreatment Pain Rating 2/10  -AR 4/10  -AR    Posttreatment Pain Rating 2/10  -AR 7/10  -AR    Pain Location - Side/Orientation Left  -AR Left  -AR    Pain Location generalized  -AR generalized  -AR    Pain Location - shoulder  -AR shoulder  -AR    Pain Intervention(s) Medication (See MAR);Cold applied;Repositioned;Ambulation/increased activity  -AR Medication (See MAR);Cold applied;Repositioned;Ambulation/increased activity;Nursing Notified  -AR      Row Name 06/19/24 1647 06/19/24 1328       Plan of Care Review    Plan of Care Reviewed With patient;spouse  -AR patient;spouse  -AR    Outcome Evaluation OT educated pt and family on shoulder precautions, ADL retraining to maintain, sling management and HEP. Post teaching, pt and spouse able to manage sling, UB dressing and HEP with supervision. Pt ambulated 225 feet and ascended/descended 2 steps with supervision- no PT needs. Desat to 81% on RA, improved to 90% or above in less than 1 minute of PLB/rest. RN notified. Pt meets criteria for same-day DC home from OT perspective and pt and spouse report feeling comfortable with planned DC home.  -AR Pt completed bed mobility with min assist, LB dressing with mod assist and ambulated 40 feet with min assist x2. Pt limited with c/o increased pain, nausea and dizziness. Pt desat to 86% on RA, /67 and pt uable to continue with session. Returned to supine, RN to notify OT when pt is appropriate for skilled intervention.  -AR      Row Name 06/19/24 1328          Therapy Assessment/Plan (OT)    Patient/Family Therapy Goal Statement (OT) Go home today  -AR     Rehab Potential (OT) good, to achieve stated therapy goals  -AR     Criteria for Skilled Therapeutic Interventions Met (OT) yes  -AR     Therapy Frequency (OT) daily  -AR     Predicted Duration of Therapy  Intervention (OT) 3 days  -AR       Row Name 06/19/24 1647 06/19/24 1328       Therapy Plan Review/Discharge Plan (OT)    Anticipated Discharge Disposition (OT) home with 24/7 care  -AR home with 24/7 care  -AR      Row Name 06/19/24 1647 06/19/24 1328       Vital Signs    Pre Systolic BP Rehab -- 134  -AR    Pre Treatment Diastolic BP -- 64  -AR    Intra Systolic BP Rehab -- 160  -AR    Intra Treatment Diastolic BP -- 67  -AR    Post Systolic BP Rehab -- 142  -AR    Post Treatment Diastolic BP -- 69  -AR    Intratreatment Heart Rate (beats/min) -- 55  -AR    Posttreatment Heart Rate (beats/min) -- 52  -AR    Pre SpO2 (%) 95  -AR 92  3L NC  -AR    O2 Delivery Pre Treatment room air  -AR supplemental O2  -AR    Intra SpO2 (%) 81  -AR 86  -AR    O2 Delivery Intra Treatment room air  -AR room air  -AR    Post SpO2 (%) 94  -AR 91  -AR    O2 Delivery Post Treatment room air  -AR supplemental O2  -AR    Pre Patient Position Supine  -AR Supine  -AR    Intra Patient Position Standing  -AR Standing  -AR    Post Patient Position Sitting  -AR Supine  -AR      Row Name 06/19/24 1647 06/19/24 1328       Positioning and Restraints    Pre-Treatment Position in bed  -AR in bed  -AR    Post Treatment Position bed  -AR bed  -AR    In Bed sitting EOB;with family/caregiver;with nsg;with brace  cold pack applied over incision  -AR notified nsg;supine;with brace;with family/caregiver;with nsg  -AR              User Key  (r) = Recorded By, (t) = Taken By, (c) = Cosigned By      Initials Name Provider Type    Veronica Mackey, MACIE Occupational Therapist                   Outcome Measures       Row Name 06/19/24 1650 06/19/24 1333       How much help from another is currently needed...    Putting on and taking off regular lower body clothing? 2  -AR 2  -AR    Bathing (including washing, rinsing, and drying) 3  -AR 2  -AR    Toileting (which includes using toilet bed pan or urinal) 2  -AR 2  -AR    Putting on and taking off regular  upper body clothing 2  -AR 2  -AR    Taking care of personal grooming (such as brushing teeth) 3  -AR 3  -AR    Eating meals 3  -AR 3  -AR    AM-PAC 6 Clicks Score (OT) 15  -AR 14  -AR      Row Name 06/19/24 1650 06/19/24 1333       Functional Assessment    Outcome Measure Options AM-PAC 6 Clicks Daily Activity (OT)  -AR AM-PAC 6 Clicks Daily Activity (OT)  -AR              User Key  (r) = Recorded By, (t) = Taken By, (c) = Cosigned By      Initials Name Provider Type    Veronica Mackey OT Occupational Therapist                    Occupational Therapy Education       Title: PT OT SLP Therapies (Done)       Topic: Occupational Therapy (Done)       Point: ADL training (Done)       Description:   Instruct learner(s) on proper safety adaptation and remediation techniques during self care or transfers.   Instruct in proper use of assistive devices.                  Learning Progress Summary             Patient Eager, E,TB,D,H, DU,VU by AR at 6/19/2024 1652    Eager, E,TB,D,H, VU,NR by AR at 6/19/2024 1334   Family Eager, E,TB,D,H, DU,VU by AR at 6/19/2024 1652    Eager, E,TB,D,H, VU,NR by AR at 6/19/2024 1334                         Point: Home exercise program (Done)       Description:   Instruct learner(s) on appropriate technique for monitoring, assisting and/or progressing therapeutic exercises/activities.                  Learning Progress Summary             Patient Eager, E,TB,D,H, DU,VU by AR at 6/19/2024 1652    Eager, E,TB,D,H, VU,NR by AR at 6/19/2024 1334   Family Eager, E,TB,D,H, DU,VU by AR at 6/19/2024 1652    Eager, E,TB,D,H, VU,NR by AR at 6/19/2024 1334                         Point: Precautions (Done)       Description:   Instruct learner(s) on prescribed precautions during self-care and functional transfers.                  Learning Progress Summary             Patient Eager, E,TB,D,H, DU,VU by AR at 6/19/2024 1652    Eager, E,TB,D,H, VU,NR by AR at 6/19/2024 1334   Family JOHN Ibarra,PAZ,D,H,  DU,VU by AR at 6/19/2024 1652    Eager, E,TB,D,H, VU,NR by AR at 6/19/2024 1334                         Point: Body mechanics (Done)       Description:   Instruct learner(s) on proper positioning and spine alignment during self-care, functional mobility activities and/or exercises.                  Learning Progress Summary             Patient Eager, E,TB,D,H, DU,VU by AR at 6/19/2024 1652    Eager, E,TB,D,H, VU,NR by AR at 6/19/2024 1334   Family Eager, E,TB,D,H, DU,VU by AR at 6/19/2024 1652    Eager, E,TB,D,H, VU,NR by AR at 6/19/2024 1334                                         User Key       Initials Effective Dates Name Provider Type Discipline    AR 07/11/23 -  Veronica Harding OT Occupational Therapist OT                  OT Recommendation and Plan  Planned Therapy Interventions (OT): activity tolerance training, BADL retraining, edema control/reduction, functional balance retraining, IADL retraining, occupation/activity based interventions, orthotic fabrication/fitting/training, patient/caregiver education/training, ROM/therapeutic exercise, transfer/mobility retraining  Therapy Frequency (OT): daily  Plan of Care Review  Plan of Care Reviewed With: patient, spouse  Outcome Evaluation: OT educated pt and family on shoulder precautions, ADL retraining to maintain, sling management and HEP. Post teaching, pt and spouse able to manage sling, UB dressing and HEP with supervision. Pt ambulated 225 feet and ascended/descended 2 steps with supervision- no PT needs. Desat to 81% on RA, improved to 90% or above in less than 1 minute of PLB/rest. RN notified. Pt meets criteria for same-day DC home from OT perspective and pt and spouse report feeling comfortable with planned DC home.     Time Calculation:   Evaluation Complexity (OT)  Review Occupational Profile/Medical/Therapy History Complexity: brief/low complexity  Assessment, Occupational Performance/Identification of Deficit Complexity: 1-3 performance  deficits  Clinical Decision Making Complexity (OT): problem focused assessment/low complexity  Overall Complexity of Evaluation (OT): low complexity     Time Calculation- OT       Row Name 06/19/24 1653 06/19/24 1335          Time Calculation- OT    OT Start Time 1327  -AR 1130  -AR     OT Received On 06/19/24  -AR 06/19/24  -AR     OT Goal Re-Cert Due Date 06/29/24  -AR 06/29/24  -AR        Timed Charges    67904 - OT Therapeutic Exercise Minutes 14  -AR --     37172 - OT Therapeutic Activity Minutes 10  -AR 11  -AR     56626 - OT Self Care/Mgmt Minutes 33  -AR --        Untimed Charges    OT Eval/Re-eval Minutes -- 60  -AR        Total Minutes    Timed Charges Total Minutes 57  -AR 11  -AR     Untimed Charges Total Minutes -- 60  -AR      Total Minutes 57  -AR 71  -AR               User Key  (r) = Recorded By, (t) = Taken By, (c) = Cosigned By      Initials Name Provider Type    AR Veronica Harding, OT Occupational Therapist                  Therapy Charges for Today       Code Description Service Date Service Provider Modifiers Qty    96024978261 HC OT THERAPEUTIC ACT EA 15 MIN 6/19/2024 Veronica Harding, OT GO 1    10885125483 HC OT EVAL LOW COMPLEXITY 4 6/19/2024 Veronica Harding, OT GO 1    77502083800 HC OT THER SUPP EA 15 MIN 6/19/2024 Veronica Harding, OT GO 3    81515184994 HC OT SELF CARE/MGMT/TRAIN EA 15 MIN 6/19/2024 Veronica Harding, OT GO 2    44547041667 HC OT THERAPEUTIC ACT EA 15 MIN 6/19/2024 Veronica Harding, OT GO 1    74174939701 HC OT THER PROC EA 15 MIN 6/19/2024 Veronica Harding L[place], OT GO 1                 Veronica Harding, OT  6/19/2024

## 2024-06-19 NOTE — BRIEF OP NOTE
TOTAL SHOULDER REVERSE ARTHROPLASTY  Progress Note    Abraham Blanco  6/19/2024    Pre-op Diagnosis:   Nontraumatic complete tear of left rotator cuff [M75.122]  Status post left rotator cuff repair [Z98.890]  S/P left rotator cuff repair [Z98.890]       Post-Op Diagnosis Codes:     * Nontraumatic complete tear of left rotator cuff [M75.122]     * Status post left rotator cuff repair [Z98.890]     * S/P left rotator cuff repair [Z98.890]    Procedure/CPT® Codes:        Procedure(s):  REVERSE TOTAL SHOULDER ARTHROPLASTY - LEFT        SURGICAL APPROACH: Deltopectoral    SURGICAL TECHNIQUE: Tenotomy      Surgeon(s):  Marcel Vega MD    Anesthesia: General with Block    Staff:   Circulator: Daniella Cee RN  Physician Assistant: Kath Patten PA-C  Scrub Person: Everett Catalan  Vendor Representative: Velasquez Chaidez  Nursing Assistant: Bailey Butt PCT         Estimated Blood Loss: 100ml    Urine Voided: * No values recorded between 6/19/2024  7:28 AM and 6/19/2024  8:55 AM *    Specimens:                None          Drains: * No LDAs found *    Findings:   Massive chronic irreparable rotator cuff tears.  Early rotator cuff tear arthropathy type picture.  Very thin remaining subscapularis but did repair this at the end      Complications: None          Marcel Vega MD     Date: 6/19/2024  Time: 09:17 EDT

## 2024-06-19 NOTE — ANESTHESIA PROCEDURE NOTES
Left PECs 1&2      Patient reassessed immediately prior to procedure    Patient location during procedure: OR  Start time: 6/19/2024 7:10 AM  Reason for block: at surgeon's request and post-op pain management  Performed by  CRNA/CAA: Ozzy Middleton, CRNA  Assisted by: Luiza Iverson RN  Preanesthetic Checklist  Completed: patient identified, IV checked, site marked, risks and benefits discussed, surgical consent, monitors and equipment checked, pre-op evaluation and timeout performed  Prep:  Pt Position: supine  Sterile barriers:cap, gloves, mask and washed/disinfected hands  Prep: ChloraPrep  Patient monitoring: blood pressure monitoring, continuous pulse oximetry and EKG  Procedure    Sedation: yes  Performed under: local infiltration  Guidance:ultrasound guided and landmark technique  Images:still images obtained, printed/placed on chart    Laterality:left  Block Type:PECS I and PECS II  Injection Technique:single-shot  Needle Type:short-bevel  Needle Gauge:20 G  Resistance on Injection: none    Medications Used: dexamethasone sodium phosphate injection - Injection   2 mg - 6/19/2024 7:10:00 AM  bupivacaine PF (MARCAINE) 0.25 % injection - Injection   30 mL - 6/19/2024 7:10:00 AM      Medications  Preservative Free Saline:10ml  Comment:Block Injection:  Total volume of LA divided between Right and Left sided blocks         Post Assessment  Injection Assessment: negative aspiration for heme, incremental injection and no paresthesia on injection  Patient Tolerance:comfortable throughout block  Complications:no  Additional Notes  Interpectoral-Pectoserratus Plane   A high-frequency linear transducer, with sterile cover, was placed medial to the coracoid process in the paramedian sagittal plane. The transducer was moved caudally to the 4th rib and rotated slightly to allow an in-plane needle trajectory from medial to lateral. Pectoralis Major Muscle (PMM), Pectoralis Minor Muscle (PmM), Thoracoacromial Artery,  "Ribs, and Pleura were identified under ultrasound. The insertion site was prepped in sterile fashion and then localized with 2-5 ml of 1% Lidocaine. Using ultrasound-guidance, a 20-gauge B-Tate 4\" Ultraplex 360 non-stimulating echogenic needle was advanced in plane until the tip of the needle was in the fascial plane between the PMM and PmM, lateral to the Thoracoacromial Artery. 1-3ml of preservative free normal saline was used to hydro-dissect the fascial planes. After the fascial plane was verified, 10ml local anesthetic (LA) was injected for Interpectoral fascial plane block. The needle was continued along the same path to the level of the 4th rib below PmM.  Initially preservative free normal saline was used to confirm needle position and then 20 ml of LA was injected for Pectoserratus fascial plane block. Aspiration every 5 ml to prevent intravascular injection. Injection was completed with negative aspiration of blood and negative intravascular injection. Injection pressures were normal with minimal resistance.     Performed by: Lucinda Her SRNA            "

## 2024-06-19 NOTE — OP NOTE
Location: [place] [address]    Surgeon: Marcel Vega M.D.       Assistant: THAIS Shirley    The skilled assistance of the above noted first assistant was necessary during this complex surgical procedure.  The surgical assistant assisted with every aspect of the operation including, but not limited to, proper and safe positioning of the patient, obtaining adequate surgical exposure, manipulation of surgical instruments, suture management, surgical knot tying when necessary, the continual process of hemostasis during the procedure itself in addition to surgical wound closure and removal of the patient from the operating table and returning the patient back to the [facility].  The assistance of the surgical assistant allowed me to perform the most sensitive and technical portions of this operation using 2 hands, thus enhancing efficiency and patient safety.  This would not be possible without the help of a skilled assistant familiar with the procedure and capable of safely performing the aforementioned tasks.    Anesthesia:   General endotracheal anesthesia    Block:  Regional interscalene block by anesthesia team with indwelling catheter    SIDE/SHOULDER: LEFT SHOULDER  Date of Surgery: 6/19/2024  Pre-operative Diagnosis                          1. LEFT SHOULDER massive chronic irreparable rotator cuff tears, prior open rotator cuff repair outside hospital, early rotator cuff tear arthropathy    Post Operative Diagnosis  SAME    Operative Procedure         1. REVERSE shoulder arthroplasty -- Reverse total shoulder arthroplasty CPT code 08058      Intraoperative Complications:  None    Estimated Blood Loss:  100 mL    Drain: n/a hemostasis was achieved    Specimen: Humeral head resected as part of shoulder replacement, no specimen sent    Antibiotics:   infused prior to incision    TXA given per anesthesia    Bone Quality: Solid bone quality on the glenoid and humeral side, excellent press-fit, tug test  intact with trial and with final implant with a size 2      Rotator Cuff (Intraoperative findings):  Massive chronic irreparable rotator cuff tears we did have an remaining subscapularis that was repairable  Labrum: full thickness    Biceps Tendon: Chronic long head of the biceps tear    Subscapularis Repair: Thin remaining subscapularis but did repair at the end  Subscapularis Repair Performed: Yes  Subscapularis repair type:   Transosseous Suture  Tendon to Tendon  Subscapularis repair with: three #2 XBraid UHMWPE 40mm taper point needle (transosseous transtendinous in horizontal mattress fashion)    Before Procedure EUA:  glenohumeral joint contracture    After Procedure EUA:  Satisfactory range of motion with minimal pistoning and no instability; full wedge, 39 mm glenosphere and a 10 degree polyethylene component was utilized to optimize soft tissue tensioning and stability    IMPLANTS  [employer]    Stem Type  Perform humeral stem  Size: 2, press-fit, tug test intact with trial and with final implant  Polyethylene insert: Vitamin E polyethylene component 0 mm for a 1/2 stem  0 degrees or 10 degrees: 10 degree polyethylene component was utilized for additional stability  Construct angle: 145 degrees    Intramedullary cut guide at 135 degrees  Retroversion (measured by version jewell): 30 degrees      Glenosphere  Perform reverse baseplate/glenoid  Size: 25 mm, 15 degree full wedge  Wedge: 15 degree full wedge  Central fixation: Central post, short post add-on  Peripheral screws: 4 peripheral screws were utilized, 3 locking screws for inserted following insertion and final tightening of the compression screw which was posterior superior  Glenosphere: 39 mm standard glenosphere impacted and final tightened in place      Time out: Time out was called and the patient, side, site, and intended procedure were confirmed.    Counts: Needle, lap sponge, and Ray-Rita sponge counts were correct prior to  closure.    Marked: The patient was marked with an indelible marker in the preoperative holding area confirming the correct site and side.    History & Physical:   A history and physical examination was completed and updated in the preoperative holding area.    Consent: The patient signed the consent form for surgery with an understanding of the risks and benefits as outlined in the clinic and in the preoperative holding area.    Patient had recent hospitalization for pneumonia last month but he has sufficiently recovered from this and was cleared from his primary care team.  He was additionally cleared from his cardiology team.  He prefers same-day discharge.  He has a supportive wife.  We will plan on same-day discharge pending satisfactory recovery in PACU.    Patient had prior open rotator cuff repair and unfortunately either had incomplete healing or recurrent tearing.  He had chronic massive irreparable tears.  He sought additional consultation with an outside surgeon that recommend reverse shoulder arthroplasty he presented to me for a third opinion with regards to performing a left reverse shoulder arthroplasty.  We talked about the risks and benefits and he desired to proceed.  He has very limited function of the left arm due to the chronic massive rotator cuff tears and profound pseudoparalysis.    Risks and Benefits:   Need for blood transfusion; medical complications with anesthesia/surgery including deep venous thrombosis, stroke, myocardial infarction, and death; potential complications if block performed; infection; revision surgery; dislocation/instability; fracture; failure of hardware; nerve or blood vessel injury; incomplete pain relief; incomplete restoration of shoulder range of motion; possibility of resection arthroplasty if procedure failed.    Indications for surgery: The patient has persistent pain interfering with activities of daily living unresponsive to a non-operative care program of  selective rest, activity modification, medications and exercises.     Operative Findings     Chronic massive rotator cuff tears, very thin remaining subscapularis, early rotator cuff tear arthropathy type picture.    Reverse Shoulder Arthroplasty Procedure in Detail:   The patient was seen and identified in the preoperative area.  The operative extremity was marked with an indelible marker.  The patient was examined and an updated history and physical was signed in the chart.  The patient understood the risks and benefits of the surgery and elected to proceed with surgery with reverse shoulder arthroplasty. The patient was taken to the operating room, placed on the operating table in the supine position where general anesthesia was administered.  The patient was positioned in a modified beach chair position.  All bony prominences were well padded.  The neck was secured and padded in neutral position.  Examination under anesthesia did not reveal any pathologic laxity of the shoulder and just revealed stiffness associated with the disease.  Range of motion measurements under anesthesia, but prior to surgery are noted above.     Next, the upper extremity and shoulder girdle region were cleaned with isopropyl alcohol and prepped and draped in usual sterile fashion.  An skin barrier dressing was placed to ensure no skin edges were exposed.  A fresh pair of surgical gloves was placed following placement of the skin barrier dressing.  The surgical incision was planned using a skin marker extending from the tip of the coracoid process along the deltopectoral interval.  A standard deltopectoral approach was made.  A #10 blade was used for the skin followed by electrocautery to maintain hemostasis.  Two large skin rakes were used to provide counter traction for tissue dissection.  The fat stripe overlying the cephalic vein was identified using Metzenbaum scissors and Debakey forceps.  The vein was bluntly retracted laterally  with the deltoid using an Army-Navy retractor and an Army-Navy retractor was used medially to retract the pectoralis major.  The upper border of the pectoralis major tendon was identified and the upper 1.5 cm of the pectoralis major tendon was released to improve humeral mobility and allow better visualization of the anterior circumflex vessels for eventual suture ligation.  The Army-Navy retractors were replaced with an Adson cerebellar self-retaining retractor.  Curved heavy Duran scissors were used to open up the space above the coracoid process and a Hohmann retractor was placed above the coracoid process for improved visualization.  The operative extremity was abducted and externally rotated by the surgical assistant to better expose the coracoacromial (CA) ligament.  Electrocautery was utilized to release the clavipectoral fascia and continued superficially just lateral to the conjoined tendon. The conjoined tendon was retracted medially with a narrow Love retractor, while being aware of the underlying musculocutaneous nerve, and the operative extremity was externally rotated to expose the anterior humeral circumflex vessels.  The anterior humeral circumflex vessels were suture ligated with dyed 0-Vicryl suture ligation sutures placed in an interrupted fashion.  The axillary nerve was protected throughout the case.     Heavy forceps were used help identify the anatomic neck and removal of capsular tissue was completed sharply along the anatomic neck with a fresh #10 blade on a long handle.  Electrocautery was used to complete the exposure and to cauterize the anterior humeral circumflex vessels between the two prior placed ligation sutures.  The dissection was carried out remaining directly on bone to release the capsular attachments along the medial calcar of the humerus and the dissection stopped proximal to the attachment of the latissimus dorsi tendon.  At this point a modified humeral head retractor  (modified-Trillat retractor) was used to retract the intact humeral head posteriorly.  The modified humeral head retractor was placed by externally rotating the humerus and then inserting the retractor while transitioning to internal rotation.  Next the inferior joint capsule was released off the glenoid staying directly on bone, once again protecting the axillary nerve. The proximal humerus was then dislocated.  The status of the rotator cuff and long head of the biceps was determined and noted above.  The intraarticular portion of the biceps was resected.        Humeral head osteotomy was performed along the anatomical neck of the humerus utilizing an intramedullary cut guide.  The cut protector was placed.  The proximal humerus was then retracted posteriorly using the posterior glenoid retractor.     A centering guide pin was placed using the inferior glenoid-referencing guide specific for the chose glenosphere baseplate noted above to insure appropriate inferior placement and with inferior tilt.  The glenoid was reamed. The glenoid was then prepared for the implant.  The glenoid was irrigated and dried thoroughly. The glenosphere baseplate component was then placed.  The implant was noted to be fully seated and double-checked with a Debakey to ensure it was down against the bone.  I also double checked the inferior portion to ensure there was no impingement and utilized the peripheral hand-held reamer to ensure we would have proper fit of the glenosphere.  The inferior and superior drill holes were drilled and screws placed sequentially, but not completely seated until compression was achieved.  The additional drill holes were drilled and screws were tightened with excellent fixation.  The screws were then seated and final-tightened.  The glenosphere (size and offset noted above) was impacted into place and final-tightened with complete seating/tested and dynamically stable.    The proximal humerus was  re-dislocated using a bone hook to prevent damage to the freshly placed glenosphere component and prevent damage to the greater tuberosity.  The cut protector was removed.  The proximal humerus was then prepared using the reverse total shoulder system and implant sizes as listed above.     The trial humeral components were placed and no instability with acceptable range of motion was noted.  Tug test intact with press-fit size 2 stem.  The trial components were removed and the final sizes were noted for the actual implants.  The wound was copiously irrigated including the humeral canal. The final humeral implant was impacted in place.  The glenohumeral joint was reduced.  There was excellent range of motion and no instability, with appropriate pistoning noted. At the termination of this closure, there was acceptable range of motion noted above.  The wound was then irrigated thoroughly and hemostasis was obtained.      The deep tissue was reapproximated with 0 Vicryl in interrupted fashion.  Subcutaneous tissue was reapproximated with 2-0 Vicryl in interrupted fashion.  Skin was reapproximated with 3-0 Monocryl in continuous running subcuticular fashion.  Steri-Strips were applied, sterile dressing, and sling were applied.  The patient tolerated the procedure well and was transferred to the recovery room in satisfactory condition.      POSTOPERATIVE PLAN:  Follow up in the office in 2 weeks with 3 views of the operative shoulder at that time  Sutures: Monocryl suture and overlying Steri-Strips   DVT prophylaxis: Lovenox as an inpatient (patient prefers same-day discharge from PACU) and aspirin 81mg by mouth twice per day for 30 days after surgery --he currently takes aspirin 81 mg daily and did  the patient and his wife to take this twice daily for 30 days as additional precaution  Weightbearing: Nonweightbearing on operative extremity  Sling for 3 to 4 weeks following the surgery  Physical therapy: Formal  outpatient physical therapy will be provided at the 2-week appointment in the office.  Reverse shoulder arthroplasty protocol      Electronically signed by Marcel Vega MD, 06/19/24, 9:19 AM EDT.

## 2024-07-02 ENCOUNTER — OFFICE VISIT (OUTPATIENT)
Age: 69
End: 2024-07-02
Payer: MEDICARE

## 2024-07-02 VITALS — TEMPERATURE: 94.5 F

## 2024-07-02 DIAGNOSIS — Z96.612 S/P REVERSE TOTAL SHOULDER ARTHROPLASTY, LEFT: Primary | ICD-10-CM

## 2024-07-02 PROCEDURE — 99024 POSTOP FOLLOW-UP VISIT: CPT

## 2024-07-02 RX ORDER — OMEGA-3-ACID ETHYL ESTERS 1 G/1
CAPSULE, LIQUID FILLED ORAL
COMMUNITY
Start: 2024-06-04

## 2024-07-02 RX ORDER — METOPROLOL SUCCINATE 50 MG/1
50 TABLET, EXTENDED RELEASE ORAL DAILY
COMMUNITY

## 2024-07-02 RX ORDER — ONDANSETRON 4 MG/1
4 TABLET, ORALLY DISINTEGRATING ORAL
COMMUNITY
Start: 2024-01-11

## 2024-07-02 RX ORDER — HUMAN INSULIN 100 [IU]/ML
INJECTION, SOLUTION SUBCUTANEOUS AS NEEDED
COMMUNITY
Start: 2024-03-05

## 2024-07-02 NOTE — PROGRESS NOTES
[facility] [place] Orthopaedic Surgery Office Follow Up - Marcel Vega MD  [facility]    Office Follow Up Visit       Patient Name: Abraham Blanco    Chief Complaint:   Chief Complaint   Patient presents with   • Post-op     2 weeks s/p total shoulder reverse arthroplasty, LEFT; DOS 06/18/2024       Referring Physician: Estrada Teixeira PA    History of Present Illness:   Patient presents today for 2-week postop visit s/p left reverse total shoulder arthroplasty with Dr. Vega.  He reports to be doing well.  He has been wearing his sling as instructed.  Pain has been controlled.    SIDE/SHOULDER: LEFT SHOULDER  Date of Surgery: 6/19/2024  Pre-operative Diagnosis                          1. LEFT SHOULDER massive chronic irreparable rotator cuff tears, prior open rotator cuff repair outside hospital, early rotator cuff tear arthropathy     Post Operative Diagnosis  SAME     Operative Procedure                                                        1. REVERSE shoulder arthroplasty -- Reverse total shoulder arthroplasty CPT code 07550      Subjective   Subjective      Review of Systems   Constitutional: Negative.  Negative for chills, fatigue and fever.   HENT: Negative.  Negative for congestion and dental problem.    Eyes: Negative.  Negative for blurred vision.   Respiratory: Negative.  Negative for shortness of breath.    Cardiovascular: Negative.  Negative for leg swelling.   Gastrointestinal: Negative.  Negative for abdominal pain.   Endocrine: Negative.  Negative for polyuria.   Genitourinary: Negative.  Negative for difficulty urinating.   Musculoskeletal:  Positive for arthralgias.   Skin: Negative.    Allergic/Immunologic: Negative.    Neurological: Negative.    Hematological: Negative.  Negative for adenopathy.   Psychiatric/Behavioral: Negative.  Negative for behavioral problems.         Past Medical  History:   Past Medical History:   Diagnosis Date   • Arthritis of back    • Arthritis of neck    • CAD (coronary artery disease)     s/p 4-vessel CABG and stenting   • CHF (congestive heart failure)    • Diabetes mellitus    • Diverticula of colon    • Elevated PSA    • Frozen shoulder    • GERD (gastroesophageal reflux disease)    • Heart disease    • Hip arthrosis    • Hyperlipidemia    • Hypertension    • Knee swelling    • Neck strain    • CHANEL (obstructive sleep apnea)     on AutoPAP   • Rotator cuff syndrome        Past Surgical History:   Past Surgical History:   Procedure Laterality Date   • CARDIAC SURGERY      4 vessel bypass   • NECK SURGERY     • SHOULDER ARTHROSCOPY W/ ROTATOR CUFF REPAIR Left 2023    Procedure: LEFT SHOULDER ARTHROSCOPY WITH MINI OPEN ROTATOR CUFF REPAIR, ACROMIOPLASTY, EXCISION LATERAL CLAVICLE;  Surgeon: Steve Serna MD;  Location: [facility];  Service: Orthopedics;  Laterality: Left;   • SHOULDER SURGERY     • TONSILLECTOMY     • TOTAL SHOULDER ARTHROPLASTY W/ DISTAL CLAVICLE EXCISION Left 2024    Procedure: REVERSE TOTAL SHOULDER ARTHROPLASTY - LEFT;  Surgeon: Marcel Vega MD;  Location: [facility];  Service: Orthopedics;  Laterality: Left;       Family History:   Family History   Problem Relation Age of Onset   • Diabetes Mother    • Hypertension Mother    • Cancer Mother    • Hypertension Father        Social History:   Social History     Socioeconomic History   • Marital status:    Tobacco Use   • Smoking status: Former     Current packs/day: 0.00     Average packs/day: 2.0 packs/day for 25.2 years (50.4 ttl pk-yrs)     Types: Cigarettes     Start date: 10/11/1971     Quit date: 1997     Years since quittin.5   • Smokeless tobacco: Never   Vaping Use   • Vaping status: Never Used   Substance and Sexual Activity   • Alcohol use: No   • Drug use: No   • Sexual activity: Not Currently     Partners: Female       Medications:    Current Outpatient Medications:   •  aspirin 81 MG EC tablet, Take 1 tablet by mouth Daily., Disp: , Rfl:   •  atorvastatin (LIPITOR) 20 MG tablet, Take 1 tablet by mouth Daily., Disp: , Rfl:   •  baclofen (LIORESAL) 10 MG tablet, Take 1 tablet by mouth 3 (Three) Times a Day As Needed for Muscle Spasms., Disp: , Rfl:   •  Cholecalciferol 25 MCG (1000 UT) tablet, Take 1 tablet by mouth Daily., Disp: , Rfl:   •  empagliflozin (Jardiance) 25 MG tablet tablet, Take 1 tablet by mouth Daily., Disp: , Rfl:   •  finasteride (PROSCAR) 5 MG tablet, Take 1 tablet by mouth Daily., Disp: , Rfl:   •  furosemide (LASIX) 10 MG half tablet, Take 1 half tablet by mouth Daily As Needed (Swelling)., Disp: , Rfl:   •  gabapentin (NEURONTIN) 300 MG capsule, Take 1 capsule by mouth 3 (Three) Times a Day As Needed., Disp: , Rfl:   •  insulin regular (humuLIN R,novoLIN R) 100 UNIT/ML injection, Inject 0-10 Units under the skin into the appropriate area as directed 3 (Three) Times a Day Before Meals., Disp: , Rfl:   •  isosorbide mononitrate (IMDUR) 60 MG 24 hr tablet, Take 1 tablet by mouth Daily., Disp: , Rfl:   •  lidocaine (LIDODERM) 5 %, Place 1 patch on the skin as directed by provider Daily As Needed for Mild Pain. Remove & Discard patch within 12 hours or as directed by MD, Disp: , Rfl:   •  lidocaine (XYLOCAINE) 5 % ointment, Apply 1 Application topically to the appropriate area as directed Every 2 (Two) Hours As Needed for Mild Pain., Disp: , Rfl:   •  losartan (COZAAR) 100 MG tablet, Take 1 tablet by mouth Daily., Disp: , Rfl:   •  Magnesium Oxide 420 MG tablet, Take 1 tablet by mouth Daily., Disp: , Rfl:   •  metoprolol succinate XL (TOPROL-XL) 50 MG 24 hr tablet, Take 1 tablet by mouth Daily., Disp: , Rfl:   •  naloxone (NARCAN) 4 MG/0.1ML nasal spray, Call 911. Don't prime. [place] in 1 nostril for overdose. Repeat in 2-3 minutes in other nostril if no or minimal breathing/responsiveness., Disp: 2 each, Rfl: 0  •  NovoLIN R  FlexPen 100 UNIT/ML injection, Inject  under the skin into the appropriate area as directed As Needed., Disp: , Rfl:   •  omega-3 acid ethyl esters (LOVAZA) 1 g capsule, , Disp: , Rfl:   •  Omega-3 Fatty Acids (fish oil) 1000 MG capsule capsule, Take 1 capsule by mouth Daily With Breakfast., Disp: , Rfl:   •  ondansetron ODT (ZOFRAN-ODT) 4 MG disintegrating tablet, Take 1 tablet by mouth., Disp: , Rfl:   •  pantoprazole (PROTONIX) 40 MG EC tablet, Take 1 tablet by mouth Daily., Disp: , Rfl:   •  potassium chloride 10 MEQ CR tablet, Take 1 tablet by mouth Daily As Needed (Only takes when taking lasix)., Disp: , Rfl:   •  sertraline (ZOLOFT) 100 MG tablet, Take 1 tablet by mouth Daily., Disp: , Rfl:   •  tamsulosin (FLOMAX) 0.4 MG capsule 24 hr capsule, Take 1 capsule by mouth Every Night., Disp: , Rfl:   •  Trulicity 0.75 MG/0.5ML solution pen-injector, Inject 0.75 mg under the skin into the appropriate area as directed 1 (One) Time Per Week. Wednesdays, Disp: , Rfl:     Allergies:   Allergies   Allergen Reactions   • Phenergan [Promethazine] Hallucinations       The following portions of the patient's history were reviewed and updated as appropriate: allergies, current medications, past family history, past medical history, past social history, past surgical history and problem list.        Objective    Objective      Vital Signs:   Vitals:    07/02/24 0918   Temp: 94.5 °F (34.7 °C)       Ortho Exam:  General: comfortable  Vascular: 2+ radial pulse  Neurologic: sensation to light touch is intact distally, elbow flexion/elbow extension/wrist flexion/wrist extension/hand intrinsics intact, able to fire deltoid; no residual defects noted from the block  Dermatologic: surgical incisions are well appearing, with no drainage or surrounding erythema; no signs or symptoms of infection or DVT      Results Review:  Imaging Results (Last 24 Hours)       Procedure Component Value Units Date/Time    XR Shoulder 2+ View Left  [[id number]] Resulted: 07/02/24 1005     Updated: 07/02/24 1006    Narrative:      Imaging: shoulder x-rays 3 views - AP, axillary, and scapular-Y x-ray   views    Side: LEFT SHOULDER    Indication for shoulder x-ray 3 views: shoulder pain    Comparison: postoperative imaging    Findings: No acute bony pathology. Implants well appearing and well   positioned after shoulder replacement.  Located and no fracture noted.  Left reverse shoulder arthroplasty - stable appearing.    I personally reviewed the above x-rays.                  Assessment / Plan      Assessment/Plan:   Problem List Items Addressed This Visit    None  Visit Diagnoses       S/P reverse total shoulder arthroplasty, left    -  Primary    Relevant Orders    XR Shoulder 2+ View Left (Completed)    Ambulatory Referral to Physical Therapy for Evaluation & Treatment (Completed)            X-ray images left shoulder s/p reverse total shoulder arthroplasty reviewed today with Dr. Vega.  Well aligned components with no sign of fracture or loosening.    He will continue sling use for the next 1 to 2 weeks.  May take breaks as needed.  Remain nonweightbearing on left side.    He will start physical therapy with protocol provided.  Referral to [facility] in [place].    Recommend anti-inflammatories as needed for pain.    History, diagnosis and treatment plan discussed with Dr. Vega.      Follow Up: 2 month, no xrays needed      Kath Patten PA-C  [facility] Orthopedic Surgery  07/02/24  11:11 EDT

## 2024-08-05 ENCOUNTER — OFFICE VISIT (OUTPATIENT)
Dept: ORTHOPEDIC SURGERY | Facility: CLINIC | Age: 69
End: 2024-08-05
Payer: MEDICARE

## 2024-08-05 VITALS — BODY MASS INDEX: 35.61 KG/M2 | WEIGHT: 235 LBS | HEIGHT: 68 IN

## 2024-08-05 DIAGNOSIS — M17.0 PRIMARY OSTEOARTHRITIS OF BOTH KNEES: Primary | ICD-10-CM

## 2024-08-05 PROCEDURE — 20610 DRAIN/INJ JOINT/BURSA W/O US: CPT | Performed by: ORTHOPAEDIC SURGERY

## 2024-08-05 RX ADMIN — LIDOCAINE HYDROCHLORIDE 5 ML: 10 INJECTION, SOLUTION EPIDURAL; INFILTRATION; INTRACAUDAL; PERINEURAL at 17:28

## 2024-08-05 RX ADMIN — LIDOCAINE HYDROCHLORIDE 5 ML: 10 INJECTION, SOLUTION EPIDURAL; INFILTRATION; INTRACAUDAL; PERINEURAL at 17:27

## 2024-08-05 RX ADMIN — METHYLPREDNISOLONE ACETATE 40 MG: 40 INJECTION, SUSPENSION INTRA-ARTICULAR; INTRALESIONAL; INTRAMUSCULAR; SOFT TISSUE at 17:28

## 2024-08-05 RX ADMIN — METHYLPREDNISOLONE ACETATE 40 MG: 40 INJECTION, SUSPENSION INTRA-ARTICULAR; INTRALESIONAL; INTRAMUSCULAR; SOFT TISSUE at 17:27

## 2024-08-05 NOTE — PROGRESS NOTES
Follow-up Visit         Patient: Abraham Blanco  YOB: 1955  Date of Encounter: 2024      Chief  Complaint:   Chief Complaint   Patient presents with    Right Knee - Follow-up, Pain    Left Knee - Follow-up, Pain         HPI:  Abraham Blanco, 69 y.o. male presents in follow-up bilateral knee pain known osteoarthritis bilateral knees last received intra-articular steroid injections 2024 with good response of nearly 3 months.  He presents today requesting repeat injections.  He is insulin-dependent diabetic but reports that his glucose remained stable after steroid injections.        Medical History:  Patient Active Problem List   Diagnosis    Prostate cancer    Acute pancreatitis without infection or necrosis    Rotator cuff tear, non-traumatic, left    Nontraumatic complete tear of left rotator cuff    Status post left rotator cuff repair    Arthritis of shoulder region, left    Pseudoparalysis    S/P left rotator cuff repair    Acute respiratory failure with hypoxemia     Past Medical History:   Diagnosis Date    Arthritis of back     Arthritis of neck     CAD (coronary artery disease)     s/p 4-vessel CABG and stenting    CHF (congestive heart failure)     Diabetes mellitus     Diverticula of colon     Elevated PSA     Frozen shoulder     GERD (gastroesophageal reflux disease)     Heart disease     Hip arthrosis     Hyperlipidemia     Hypertension     Knee swelling     Neck strain     CHANEL (obstructive sleep apnea)     on AutoPAP    Rotator cuff syndrome            Social History:  Social History     Socioeconomic History    Marital status:    Tobacco Use    Smoking status: Former     Current packs/day: 0.00     Average packs/day: 2.0 packs/day for 25.2 years (50.4 ttl pk-yrs)     Types: Cigarettes     Start date: 10/11/1971     Quit date: 1997     Years since quittin.6    Smokeless tobacco: Never   Vaping Use    Vaping status: Never Used   Substance and Sexual  Activity    Alcohol use: No    Drug use: No    Sexual activity: Not Currently     Partners: Female           Current Medications:    Current Outpatient Medications:     aspirin 81 MG EC tablet, Take 1 tablet by mouth Daily., Disp: , Rfl:     atorvastatin (LIPITOR) 20 MG tablet, Take 1 tablet by mouth Daily., Disp: , Rfl:     baclofen (LIORESAL) 10 MG tablet, Take 1 tablet by mouth 3 (Three) Times a Day As Needed for Muscle Spasms., Disp: , Rfl:     Cholecalciferol 25 MCG (1000 UT) tablet, Take 1 tablet by mouth Daily., Disp: , Rfl:     empagliflozin (Jardiance) 25 MG tablet tablet, Take 1 tablet by mouth Daily., Disp: , Rfl:     finasteride (PROSCAR) 5 MG tablet, Take 1 tablet by mouth Daily., Disp: , Rfl:     furosemide (LASIX) 10 MG half tablet, Take 1 half tablet by mouth Daily As Needed (Swelling)., Disp: , Rfl:     gabapentin (NEURONTIN) 300 MG capsule, Take 1 capsule by mouth 3 (Three) Times a Day As Needed., Disp: , Rfl:     insulin regular (humuLIN R,novoLIN R) 100 UNIT/ML injection, Inject 0-10 Units under the skin into the appropriate area as directed 3 (Three) Times a Day Before Meals., Disp: , Rfl:     isosorbide mononitrate (IMDUR) 60 MG 24 hr tablet, Take 1 tablet by mouth Daily., Disp: , Rfl:     lidocaine (LIDODERM) 5 %, Place 1 patch on the skin as directed by provider Daily As Needed for Mild Pain. Remove & Discard patch within 12 hours or as directed by MD, Disp: , Rfl:     lidocaine (XYLOCAINE) 5 % ointment, Apply 1 Application topically to the appropriate area as directed Every 2 (Two) Hours As Needed for Mild Pain., Disp: , Rfl:     losartan (COZAAR) 100 MG tablet, Take 1 tablet by mouth Daily., Disp: , Rfl:     Magnesium Oxide 420 MG tablet, Take 1 tablet by mouth Daily., Disp: , Rfl:     metoprolol succinate XL (TOPROL-XL) 50 MG 24 hr tablet, Take 1 tablet by mouth Daily., Disp: , Rfl:     naloxone (NARCAN) 4 MG/0.1ML nasal spray, Call 911. Don't prime. [place] in 1 nostril for overdose. Repeat  in 2-3 minutes in other nostril if no or minimal breathing/responsiveness., Disp: 2 each, Rfl: 0    NovoLIN R FlexPen 100 UNIT/ML injection, Inject  under the skin into the appropriate area as directed As Needed., Disp: , Rfl:     omega-3 acid ethyl esters (LOVAZA) 1 g capsule, , Disp: , Rfl:     Omega-3 Fatty Acids (fish oil) 1000 MG capsule capsule, Take 1 capsule by mouth Daily With Breakfast., Disp: , Rfl:     ondansetron ODT (ZOFRAN-ODT) 4 MG disintegrating tablet, Take 1 tablet by mouth., Disp: , Rfl:     pantoprazole (PROTONIX) 40 MG EC tablet, Take 1 tablet by mouth Daily., Disp: , Rfl:     potassium chloride 10 MEQ CR tablet, Take 1 tablet by mouth Daily As Needed (Only takes when taking lasix)., Disp: , Rfl:     sertraline (ZOLOFT) 100 MG tablet, Take 1 tablet by mouth Daily., Disp: , Rfl:     tamsulosin (FLOMAX) 0.4 MG capsule 24 hr capsule, Take 1 capsule by mouth Every Night., Disp: , Rfl:     Trulicity 0.75 MG/0.5ML solution pen-injector, Inject 0.75 mg under the skin into the appropriate area as directed 1 (One) Time Per Week. Wednesdays, Disp: , Rfl:         Allergies:  Allergies   Allergen Reactions    Phenergan [Promethazine] Hallucinations           Family History:  Family History   Problem Relation Age of Onset    Diabetes Mother     Hypertension Mother     Cancer Mother     Hypertension Father            Surgical History:  Past Surgical History:   Procedure Laterality Date    CARDIAC SURGERY  2011    4 vessel bypass    NECK SURGERY      SHOULDER ARTHROSCOPY W/ ROTATOR CUFF REPAIR Left 11/07/2023    Procedure: LEFT SHOULDER ARTHROSCOPY WITH MINI OPEN ROTATOR CUFF REPAIR, ACROMIOPLASTY, EXCISION LATERAL CLAVICLE;  Surgeon: Steve Serna MD;  Location: [facility];  Service: Orthopedics;  Laterality: Left;    SHOULDER SURGERY      TONSILLECTOMY      TOTAL SHOULDER ARTHROPLASTY W/ DISTAL CLAVICLE EXCISION Left 06/19/2024    Procedure: REVERSE TOTAL SHOULDER ARTHROPLASTY - LEFT;  Surgeon:  "Marcel Vega MD;  Location: [facility];  Service: Orthopedics;  Laterality: Left;         Vitals:  Vitals:    08/05/24 0924   Weight: 107 kg (235 lb)   Height: 172.7 cm (68\")     Body mass index is 35.73 kg/m².          Orthopedic Examination: Bilateral knees demonstrates minimal effusion moderate medial joint line tenderness no gross instability normal neurovascular status.          Assessment & Plan:   69 y.o. male presents with known osteoarthritis bilateral knees today he is provided his third intra-articular steroid injection Depo-Medrol 40 mg lidocaine block intra-articular to both knees he will follow-up as needed.           Diagnosis Plan   1. Primary osteoarthritis of both knees  Large Joint Arthrocentesis    Large Joint Arthrocentesis            Large Joint Arthrocentesis: L knee  Date/Time: 8/5/2024 5:27 PM  Consent given by: patient  Site marked: site marked  Timeout: Immediately prior to procedure a time out was called to verify the correct patient, procedure, equipment, support staff and site/side marked as required   Supporting Documentation  Indications: pain   Procedure Details  Location: knee - L knee  Preparation: Patient was prepped and draped in the usual sterile fashion  Needle size: 25 G  Approach: anterolateral  Medications administered: 5 mL lidocaine PF 1% 1 %; 40 mg methylPREDNISolone acetate 40 MG/ML  Patient tolerance: patient tolerated the procedure well with no immediate complications      Large Joint Arthrocentesis: R knee  Date/Time: 8/5/2024 5:28 PM  Consent given by: patient  Site marked: site marked  Timeout: Immediately prior to procedure a time out was called to verify the correct patient, procedure, equipment, support staff and site/side marked as required   Supporting Documentation  Indications: pain   Procedure Details  Location: knee - R knee  Preparation: Patient was prepped and draped in the usual sterile fashion  Needle size: 25 G  Approach: " anterolateral  Medications administered: 40 mg methylPREDNISolone acetate 40 MG/ML; 5 mL lidocaine PF 1% 1 %  Patient tolerance: patient tolerated the procedure well with no immediate complications              Cc:  Estrada Teixeira PA              This document has been electronically signed by Steve Serna MD   August 5, 2024 17:28 EDT

## 2024-08-12 RX ORDER — LIDOCAINE HYDROCHLORIDE 10 MG/ML
5 INJECTION, SOLUTION EPIDURAL; INFILTRATION; INTRACAUDAL; PERINEURAL
Status: COMPLETED | OUTPATIENT
Start: 2024-08-05 | End: 2024-08-05

## 2024-08-12 RX ORDER — METHYLPREDNISOLONE ACETATE 40 MG/ML
40 INJECTION, SUSPENSION INTRA-ARTICULAR; INTRALESIONAL; INTRAMUSCULAR; SOFT TISSUE
Status: COMPLETED | OUTPATIENT
Start: 2024-08-05 | End: 2024-08-05

## 2024-09-03 ENCOUNTER — OFFICE VISIT (OUTPATIENT)
Age: 69
End: 2024-09-03
Payer: MEDICARE

## 2024-09-03 DIAGNOSIS — Z96.612 S/P REVERSE TOTAL SHOULDER ARTHROPLASTY, LEFT: Primary | ICD-10-CM

## 2024-09-03 PROCEDURE — 99024 POSTOP FOLLOW-UP VISIT: CPT

## 2024-09-03 NOTE — PROGRESS NOTES
[facility] Follow Up       Office Follow Up Visit       Patient Name: Abraham Blanco    Chief Complaint:   Chief Complaint   Patient presents with   • Post-op     2.5 month recheck - status post total shoulder reverse arthroplasty, LEFT; DOS 06/18/2024       Referring Physician: No ref. provider found    History of Present Illness:   Abraham Blanco returns to clinic today for postop visit 2.5 months s/p left reverse total shoulder arthroplasty with Dr. Vega.  He reports feeling well.  He has been in physical therapy at [facility] [place].  He is seeing improvements in left shoulder range of motion.  Incision has healed.  No complaints or concerns at this time.      Subjective     Review of Systems   Constitutional:  Negative for chills, fever, unexpected weight gain and unexpected weight loss.   HENT:  Negative for congestion, postnasal drip and rhinorrhea.    Eyes:  Negative for blurred vision.   Respiratory:  Negative for shortness of breath.    Cardiovascular:  Negative for leg swelling.   Gastrointestinal:  Negative for abdominal pain, nausea and vomiting.   Genitourinary:  Negative for difficulty urinating.   Musculoskeletal:  Positive for arthralgias. Negative for gait problem, joint swelling and myalgias.   Skin:  Negative for skin lesions and wound.   Neurological:  Negative for dizziness, weakness, light-headedness and numbness.   Hematological:  Does not bruise/bleed easily.   Psychiatric/Behavioral:  Negative for depressed mood.    All other systems reviewed and are negative.       I have reviewed and updated the following portions of the patient's history and review of systems: allergies, current medications, past family history, past medical history, past social history, past surgical history and problem list.    Medications:   Current Outpatient Medications:   •  aspirin 81 MG EC tablet, Take 1 tablet by mouth  Daily., Disp: , Rfl:   •  atorvastatin (LIPITOR) 20 MG tablet, Take 1 tablet by mouth Daily., Disp: , Rfl:   •  baclofen (LIORESAL) 10 MG tablet, Take 1 tablet by mouth 3 (Three) Times a Day As Needed for Muscle Spasms., Disp: , Rfl:   •  Cholecalciferol 25 MCG (1000 UT) tablet, Take 1 tablet by mouth Daily., Disp: , Rfl:   •  empagliflozin (Jardiance) 25 MG tablet tablet, Take 1 tablet by mouth Daily., Disp: , Rfl:   •  finasteride (PROSCAR) 5 MG tablet, Take 1 tablet by mouth Daily., Disp: , Rfl:   •  furosemide (LASIX) 10 MG half tablet, Take 1 half tablet by mouth Daily As Needed (Swelling)., Disp: , Rfl:   •  gabapentin (NEURONTIN) 300 MG capsule, Take 1 capsule by mouth 3 (Three) Times a Day As Needed., Disp: , Rfl:   •  insulin regular (humuLIN R,novoLIN R) 100 UNIT/ML injection, Inject 0-10 Units under the skin into the appropriate area as directed 3 (Three) Times a Day Before Meals., Disp: , Rfl:   •  isosorbide mononitrate (IMDUR) 60 MG 24 hr tablet, Take 1 tablet by mouth Daily., Disp: , Rfl:   •  lidocaine (LIDODERM) 5 %, Place 1 patch on the skin as directed by provider Daily As Needed for Mild Pain. Remove & Discard patch within 12 hours or as directed by MD, Disp: , Rfl:   •  lidocaine (XYLOCAINE) 5 % ointment, Apply 1 Application topically to the appropriate area as directed Every 2 (Two) Hours As Needed for Mild Pain., Disp: , Rfl:   •  losartan (COZAAR) 100 MG tablet, Take 1 tablet by mouth Daily., Disp: , Rfl:   •  Magnesium Oxide 420 MG tablet, Take 1 tablet by mouth Daily., Disp: , Rfl:   •  metoprolol succinate XL (TOPROL-XL) 50 MG 24 hr tablet, Take 1 tablet by mouth Daily., Disp: , Rfl:   •  naloxone (NARCAN) 4 MG/0.1ML nasal spray, Call 911. Don't prime. [place] in 1 nostril for overdose. Repeat in 2-3 minutes in other nostril if no or minimal breathing/responsiveness., Disp: 2 each, Rfl: 0  •  NovoLIN R FlexPen 100 UNIT/ML injection, Inject  under the skin into the appropriate area as  directed As Needed., Disp: , Rfl:   •  omega-3 acid ethyl esters (LOVAZA) 1 g capsule, , Disp: , Rfl:   •  Omega-3 Fatty Acids (fish oil) 1000 MG capsule capsule, Take 1 capsule by mouth Daily With Breakfast., Disp: , Rfl:   •  ondansetron ODT (ZOFRAN-ODT) 4 MG disintegrating tablet, Take 1 tablet by mouth., Disp: , Rfl:   •  pantoprazole (PROTONIX) 40 MG EC tablet, Take 1 tablet by mouth Daily., Disp: , Rfl:   •  potassium chloride 10 MEQ CR tablet, Take 1 tablet by mouth Daily As Needed (Only takes when taking lasix)., Disp: , Rfl:   •  sertraline (ZOLOFT) 100 MG tablet, Take 1 tablet by mouth Daily., Disp: , Rfl:   •  tamsulosin (FLOMAX) 0.4 MG capsule 24 hr capsule, Take 1 capsule by mouth Every Night., Disp: , Rfl:   •  Trulicity 0.75 MG/0.5ML solution pen-injector, Inject 0.75 mg under the skin into the appropriate area as directed 1 (One) Time Per Week. Wednesdays, Disp: , Rfl:     Allergies:   Allergies   Allergen Reactions   • Phenergan [Promethazine] Hallucinations         Objective      Vital Signs: There were no vitals filed for this visit.    Ortho Exam:  General: no acute distress, comfortable  Vitals reviewed in chart    Musculoskeletal Exam:    SIDE: Left shoulder  Incision healed without sign of infection    Tenderness: No focal tenderness    Range of motion measurements (degrees): 130/120/40/40  Painful arc of motion: No  No evidence of septic joint      Results Review:  XR Shoulder 2+ View Left  Imaging: shoulder x-rays 3 views - AP, axillary, and scapular-Y x-ray   views    Side: LEFT SHOULDER    Indication for shoulder x-ray 3 views: shoulder pain    Comparison: postoperative imaging    Findings: No acute bony pathology. Implants well appearing and well   positioned after shoulder replacement.  Located and no fracture noted.  Left reverse shoulder arthroplasty - stable appearing.    I personally reviewed the above x-rays.       I have noted results reviewed from previous  encounter.        Assessment / Plan      Assessment:   Diagnoses and all orders for this visit:    1. S/P reverse total shoulder arthroplasty, left (Primary)  -     Cancel: Ambulatory Referral to Physical Therapy for Evaluation & Treatment  -     Ambulatory Referral to Physical Therapy for Evaluation & Treatment        Quality Metrics:   BMI:   Class 2 Severe Obesity (BMI >=35 and <=39.9). Obesity-related health conditions include the following: osteoarthritis. Obesity is unchanged.    Tobacco:   Abraham Blanco  reports that he quit smoking about 27 years ago. His smoking use included cigarettes. He started smoking about 52 years ago. He has a 50.4 pack-year smoking history. He has never used smokeless tobacco.    Plan:  Doing well 2.5 months s/p left reverse total shoulder arthroplasty with Dr. Vega.  Encouraged to continue with physical therapy at [facility] for continued left shoulder mobilization.  May start gradual strengthening.  We will see him back in 3 months with repeat x-rays.    History, diagnosis and treatment plan discussed with Dr. Vega.        Follow Up:   3 months with 3+ views left shoulder    Kath Patten PA-C  [facility]    Dictated using Dragon Speech Recognition.

## 2025-02-07 ENCOUNTER — PATIENT OUTREACH (OUTPATIENT)
Dept: CASE MANAGEMENT | Facility: OTHER | Age: 70
End: 2025-02-07
Payer: MEDICARE

## 2025-02-07 NOTE — PLAN OF CARE
Problem: Diabetes Type 2  Goal: Protect Myself From Diabetes Complications  Outcome: Progressing  Intervention: My Protect Myself From Diabetes Complications To Do List  Description:   Why is this important?  Having diabetes puts you at risk for complications, such as kidney disease, heart disease, nerve damage and eye or dental problems.  You can manage your risk by making healthy lifestyle choices and getting regular checkups.    Flowsheets (Taken 2/7/2025 1515)  My Protect Myself From Diabetes Complications To Do List:   ask whether you need to make changes to protect your kidneys   get a foot exam at least once per year   manage my weight   get an eye exam every year   get a regular dental checkup (2 times per year)   stay up to date with vaccines  Goal: Monitor My Blood Sugar  Outcome: Progressing  Intervention: My Blood Sugar Management To Do List  Description:   Why is this important?  Checking your blood sugar (glucose) at home helps to keep it from getting very high or very low.  Writing the results in a diary or log, or using an kamari, helps your diabetes care provider know how to care for you.  Your blood sugar (glucose) log should have the time, date and results.  Also write down the amount of insulin or other medicine that you take.    Flowsheets (Taken 2/7/2025 1515)  My Blood Sugar Management To Do List:   check blood sugar (glucose) at prescribed times   check blood sugar (glucose) before and after exercise   enter blood sugar (glucose) readings and medication or insulin into daily log   take the blood sugar (glucose) log to all provider visits   take the blood sugar (glucose) meter to all provider visits  Goal: Learn About Diabetes  Outcome: Progressing  Goal: Perform Foot Care  Outcome: Progressing  Goal: Manage My Stress  Outcome: Progressing  Goal: Find Ways to Be Active  Outcome: Progressing  Goal: Optimal Care Coordination of a Patient Experiencing Diabetes, Type 2  Outcome:  Progressing  Intervention: Alleviate Barriers to Glycemic Management  Flowsheets (Taken 2/7/2025 1515)  Alleviate Barriers to Glycemic Management:   barriers to adherence to treatment plan identified   blood glucose monitoring encouraged   dental care encouraged   blood glucose readings reviewed   resources required to improve adherence to care identified   self-awareness of signs/symptoms of hypo or hyperglycemia encouraged   use of blood glucose monitoring log promoted

## 2025-02-07 NOTE — OUTREACH NOTE
"AMBULATORY CASE MANAGEMENT NOTE    Names and Relationships of Patient/Support Persons: Contact: Abraham Blanco \"JU\"; Relationship: Self -     Patient Outreach    RN-ACM outreach to patient per [facility] proactive list for diabetes mgmt., patient answered call, ACM introduced self and explained role/reason for call. Last A1c June 2024 was 8.3; patient reports average BG readings of less than 200. Patient asks about a bloodless glucose meter that goes on the finger and also checks blood pressure, states he saw it on television. ACM advised patient of being unfamiliar with this new device but will research options. Pt voices appreciation, states [facility]; is on Trulicity injections. Care plan created, future outreach scheduled.     Adult Patient Profile  Questions/Answers      Flowsheet Row Most Recent Value   Symptoms/Conditions Managed at Home diabetes, type 2   Barriers to Managing Health none   Diabetes Management Strategies medication therapy, diet modification, routine screenings   Diabetes Self-Management Outcome well   Diabetes Comment Pt reports averages of less than 200   Identifying Health Goals keep illness under control   Taking Medications Not Prescribed no   Missed Doses of Prescribed Medications During Past Week no   Taken Prescribed Medications at Different Time or Schedule During Past Week no   Taken More or Less Medication Than Prescribed no   Barriers to Taking Medication as Prescribed none            Education Documentation  Diabetes Technology, taught by Jen Bragg, RN at 2/7/2025  3:15 PM.  Learner: Patient  Readiness: Acceptance  Method: Explanation  Response: Verbalizes Understanding    Continuous Glucose Monitoring (CGM), taught by Jen Bragg, RN at 2/7/2025  3:15 PM.  Learner: Patient  Readiness: Acceptance  Method: Explanation  Response: Verbalizes Understanding    Blood Glucose Monitoring, taught by Jen Bragg, RN at 2/7/2025  3:15 PM.  Learner: " Patient  Readiness: Acceptance  Method: Explanation  Response: Verbalizes Understanding    Blood Glucose Goal, taught by Jen Bragg, RN at 2/7/2025  3:15 PM.  Learner: Patient  Readiness: Acceptance  Method: Explanation  Response: Verbalizes Understanding    A1C Goal, taught by Jen Bragg, RN at 2/7/2025  3:15 PM.  Learner: Patient  Readiness: Acceptance  Method: Explanation  Response: Verbalizes Understanding    Frequency of Testing, taught by Jen Bragg, RN at 2/7/2025  3:15 PM.  Learner: Patient  Readiness: Acceptance  Method: Explanation  Response: Verbalizes Understanding          Jen PEARSON  Ambulatory Case Management    2/7/2025, 15:17 EST

## 2025-03-10 ENCOUNTER — PATIENT OUTREACH (OUTPATIENT)
Dept: CASE MANAGEMENT | Facility: OTHER | Age: 70
End: 2025-03-10
Payer: MEDICARE

## 2025-03-10 NOTE — OUTREACH NOTE
"AMBULATORY CASE MANAGEMENT NOTE    Names and Relationships of Patient/Support Persons: Contact: Abraham Blanco \"JU\"; Relationship: Self -     Patient Outreach    Scheduled outreach with patient for program progression. Patient reports good BG control with averages of less than 200. Pt reports increasing activity levels over the last several weeks and states he has lost a lot of weight. Pt states he weighed 203 lbs this morning which would be 32 lbs less than last recorded weight in [facility]. ACM praised pt's success with lifestyle changes and encouraged close monitoring of BG levels as his weight loss and increase in exercise will effect BG levels; pt v/u. Pt endorses healthy hydration practices, states he drinks a lot of water with a bit of sweet n low added. Pt v/u of education provided. Care plan goals reviewed, updated. Future outreach scheduled.     Education Documentation  unresolved/worsening symptoms, taught by Jen Bragg, RN at 3/10/2025 12:19 PM.  Learner: Patient  Readiness: Acceptance  Method: Explanation  Response: Verbalizes Understanding    hypoglycemia identification and treatment, taught by Jen Bragg, RN at 3/10/2025 12:19 PM.  Learner: Patient  Readiness: Acceptance  Method: Explanation  Response: Verbalizes Understanding    hyperglycemia identification and treatment, taught by Jen Bragg, RN at 3/10/2025 12:19 PM.  Learner: Patient  Readiness: Acceptance  Method: Explanation  Response: Verbalizes Understanding    blood glucose monitoring, taught by Jen Bragg, RN at 3/10/2025 12:19 PM.  Learner: Patient  Readiness: Acceptance  Method: Explanation  Response: Verbalizes Understanding    When to Seek Medical Attention, taught by Jen Bragg, RN at 3/10/2025 12:19 PM.  Learner: Patient  Readiness: Acceptance  Method: Explanation  Response: Verbalizes Understanding    Adherence to Disease Management Plan, taught by Jen Bragg, RN at 3/10/2025 12:19 PM.  Learner: " Patient  Readiness: Acceptance  Method: Explanation  Response: Verbalizes Understanding    Safety, taught by Jen Bragg RN at 3/10/2025 12:19 PM.  Learner: Patient  Readiness: Acceptance  Method: Explanation  Response: Verbalizes Understanding    Risk Factors, taught by Jen Bragg, RN at 3/10/2025 12:19 PM.  Learner: Patient  Readiness: Acceptance  Method: Explanation  Response: Verbalizes Understanding          Jen PEARSON  Ambulatory Case Management    3/10/2025, 12:19 EDT

## 2025-03-10 NOTE — PLAN OF CARE
Problem: Diabetes Type 2  Goal: Protect Myself From Diabetes Complications  Outcome: Progressing  Goal: Monitor My Blood Sugar  Outcome: Progressing  Goal: Learn About Diabetes  Outcome: Progressing  Goal: Perform Foot Care  Outcome: Met  Intervention: My Foot Care To Do List  Description:   Why is this important?  Good foot care is very important when you have diabetes.  There are many things you can do to keep your feet healthy and catch a problem early.    Goal: Manage My Stress  Outcome: Met  Intervention: My Stress Management To Do List  Description:   Why is this important?  When you are stressed down or upset your body reacts too.    Goal: Find Ways to Be Active  Outcome: Met  Intervention: My Activity To Do List  Description:   Why is this important?  Being more active can help you to manage your blood sugar (glucose) levels.  Being active can also help to prevent diabetes complications.    Goal: Optimal Care Coordination of a Patient Experiencing Diabetes, Type 2  Outcome: Progressing  Intervention: Monitor and Manage Follow-Up for Comorbidities  Flowsheets (Taken 3/10/2025 1217)  Monitor and Manage Follow-Up for Comorbidities:   activity based on tolerance and functional limitations encouraged   healthy lifestyle promoted   signs and symptoms of comorbidities monitored   vital signs and trends reviewed  Intervention: Optimize Functional Ability  Flowsheets (Taken 3/10/2025 1217)  Optimize Functional Ability:   daily activity/exercise promoted   activity or exercise based on tolerance and functional limitations encouraged   incremental increase in activity and exercise promoted   self-care encouraged   maximum independence promoted  Intervention: Support Wellbeing and Self-Management Success  Flowsheets (Taken 3/10/2025 1217)  Support Wellbeing and Self-Management Success:   autonomy in care promoted   healthy lifestyle promoted   family involvement promoted   support and encouragement provided   patient  response to treatment assessed

## 2025-04-14 ENCOUNTER — PATIENT OUTREACH (OUTPATIENT)
Dept: CASE MANAGEMENT | Facility: OTHER | Age: 70
End: 2025-04-14
Payer: MEDICARE

## 2025-04-14 NOTE — PLAN OF CARE
Problem: Diabetes Type 2  Goal: Protect Myself From Diabetes Complications  Outcome: Progressing  Goal: Monitor My Blood Sugar  Outcome: Progressing  Goal: Learn About Diabetes  Outcome: Progressing  Goal: Optimal Care Coordination of a Patient Experiencing Diabetes, Type 2  Outcome: Progressing  Intervention: Monitor and Manage Follow-Up for Comorbidities  Flowsheets (Taken 4/14/2025 5250)  Monitor and Manage Follow-Up for Comorbidities:   activity based on tolerance and functional limitations encouraged   healthy lifestyle promoted   signs and symptoms of comorbidities monitored  Intervention: Optimize Functional Ability  Flowsheets (Taken 4/14/2025 8974)  Optimize Functional Ability:   self-care encouraged   maximum independence promoted   activity or exercise based on tolerance and functional limitations encouraged   daily activity/exercise promoted   incremental increase in activity and exercise promoted

## 2025-04-14 NOTE — OUTREACH NOTE
"AMBULATORY CASE MANAGEMENT NOTE    Names and Relationships of Patient/Support Persons: Contact: Abraham Blanco \"JU\"; Relationship: Self -     Patient Outreach    Scheduled outreach with patient for program progression. Patient reports continued good mgmt of BG/Diabetes with average readings staying below 200. Pt v/u of s/s hyper and hypo-glycemia; does keep something sweet with him when he's outside working in case he starts feeling like his sugar is dropping. No falls reported. Pt reports good dietary and hydration practices and endorses feeling much better than he did several weeks ago. Education provided, understanding voiced. Care plan updated; future outreach scheduled.     Education Documentation  Risks, taught by Jen Bragg RN at 4/14/2025  3:53 PM.  Learner: Patient  Readiness: Acceptance  Method: Explanation  Response: Verbalizes Understanding    Benefits, taught by Jen Bragg, RN at 4/14/2025  3:53 PM.  Learner: Patient  Readiness: Acceptance  Method: Explanation  Response: Verbalizes Understanding    Adjustment to Diet/Medications, taught by Jen Bragg RN at 4/14/2025  3:53 PM.  Learner: Patient  Readiness: Acceptance  Method: Explanation  Response: Verbalizes Understanding    Hypoglycemia Identification and Treatment, taught by Jen Bragg RN at 4/14/2025  3:53 PM.  Learner: Patient  Readiness: Acceptance  Method: Explanation  Response: Verbalizes Understanding    Hyperglycemia Identification and Treatment, taught by Jen Bragg, RN at 4/14/2025  3:53 PM.  Learner: Patient  Readiness: Acceptance  Method: Explanation  Response: Verbalizes Understanding    Blood Glucose Goal, taught by Jen Bragg RN at 4/14/2025  3:53 PM.  Learner: Patient  Readiness: Acceptance  Method: Explanation  Response: Verbalizes Understanding    physical activity benefits, taught by Jen Bragg RN at 4/14/2025  3:53 PM.  Learner: Patient  Readiness: Acceptance  Method: Explanation  Response: " Verbalizes Understanding    healthy eating, taught by Jen Bragg, RN at 4/14/2025  3:53 PM.  Learner: Patient  Readiness: Acceptance  Method: Explanation  Response: Verbalizes Understanding    Adherence to Disease Management Plan, taught by Jen Bragg, RN at 4/14/2025  3:53 PM.  Learner: Patient  Readiness: Acceptance  Method: Explanation  Response: Verbalizes Understanding    Risk Factors, taught by Jen Bragg, RN at 4/14/2025  3:53 PM.  Learner: Patient  Readiness: Acceptance  Method: Explanation  Response: Verbalizes Understanding          Jen PEARSON  Ambulatory Case Management    4/14/2025, 15:54 EDT

## (undated) DEVICE — DRSNG WND GZ PAD BORDERED 4X8IN STRL

## (undated) DEVICE — SUT MONOCRYL PLS ANTIB UND 3/0  PS1 27IN

## (undated) DEVICE — GOWN,NON-REINFORCED,SIRUS,SET IN SLV,XL: Brand: MEDLINE

## (undated) DEVICE — APPL CHLORAPREP TINTED 26ML TEAL

## (undated) DEVICE — TP SILK DURAPORE 1 IN

## (undated) DEVICE — GLV SURG BIOGEL LTX PF 7 1/2

## (undated) DEVICE — PK MAJ SHLDR SPLT 10

## (undated) DEVICE — SUT ETHIB 1 CT1 30IN  X425H

## (undated) DEVICE — ANTIBACTERIAL UNDYED BRAIDED (POLYGLACTIN 910), SYNTHETIC ABSORBABLE SUTURE: Brand: COATED VICRYL

## (undated) DEVICE — SUT VICRYL PLS CTD ANTIB CR8 CT1 SZ0 27IN BR/VIL VCPP31D

## (undated) DEVICE — SPNG GZ WOVN 4X4IN 12PLY 10/BX STRL

## (undated) DEVICE — TRAP FLD MINIVAC MEGADYNE 100ML

## (undated) DEVICE — HDRST POSTIN FM CRDL TRACH SLOT NONCOMRESS 9X8X4IN

## (undated) DEVICE — MEDI-VAC YANKAUER SUCTION HANDLE: Brand: CARDINAL HEALTH

## (undated) DEVICE — GLV SURG SENSICARE PI ORTHO SZ7.5 LF STRL

## (undated) DEVICE — STRIP,CLOSURE,WOUND,MEDI-STRIP,1/2X4: Brand: MEDLINE

## (undated) DEVICE — 450 ML BOTTLE OF 0.05% CHLORHEXIDINE GLUCONATE IN 99.95% STERILE WATER FOR IRRIGATION, USP AND APPLICATOR.: Brand: IRRISEPT ANTIMICROBIAL WOUND LAVAGE

## (undated) DEVICE — BLANKT WARM LOWR/BDY 100X120CM

## (undated) DEVICE — STRYKER PERFORMANCE SERIES SAGITTAL BLADE: Brand: STRYKER PERFORMANCE SERIES

## (undated) DEVICE — DRAPE,TOP,102X53,STERILE: Brand: MEDLINE

## (undated) DEVICE — PENCL SMOKE/EVAC MEGADYNE TELESCP 10FT

## (undated) DEVICE — ELECTRD NDL EZ CLN STD 2.75IN

## (undated) DEVICE — PATIENT RETURN ELECTRODE, SINGLE-USE, CONTACT QUALITY MONITORING, ADULT, WITH 9FT CORD, FOR PATIENTS WEIGING OVER 33LBS. (15KG): Brand: MEGADYNE